# Patient Record
Sex: MALE | Race: WHITE | NOT HISPANIC OR LATINO | Employment: UNEMPLOYED | URBAN - METROPOLITAN AREA
[De-identification: names, ages, dates, MRNs, and addresses within clinical notes are randomized per-mention and may not be internally consistent; named-entity substitution may affect disease eponyms.]

---

## 2019-02-02 ENCOUNTER — HOSPITAL ENCOUNTER (OUTPATIENT)
Dept: URGENT CARE | Facility: CLINIC | Age: 24
Discharge: HOME | End: 2019-02-02
Attending: EMERGENCY MEDICINE

## 2019-03-19 ENCOUNTER — OFFICE VISIT CONVERTED (OUTPATIENT)
Dept: INTERNAL MEDICINE | Facility: CLINIC | Age: 24
End: 2019-03-19
Attending: NURSE PRACTITIONER

## 2019-03-19 ENCOUNTER — HOSPITAL ENCOUNTER (OUTPATIENT)
Dept: OTHER | Facility: HOSPITAL | Age: 24
Discharge: HOME | End: 2019-03-19
Attending: NURSE PRACTITIONER

## 2019-04-08 ENCOUNTER — HOSPITAL ENCOUNTER (OUTPATIENT)
Dept: URGENT CARE | Facility: CLINIC | Age: 24
Discharge: HOME | End: 2019-04-08
Attending: NURSE PRACTITIONER

## 2020-07-27 ENCOUNTER — HOSPITAL ENCOUNTER (OUTPATIENT)
Dept: URGENT CARE | Facility: CLINIC | Age: 25
Discharge: HOME | End: 2020-07-27
Attending: EMERGENCY MEDICINE

## 2020-08-08 ENCOUNTER — HOSPITAL ENCOUNTER (OUTPATIENT)
Dept: URGENT CARE | Facility: CLINIC | Age: 25
Discharge: HOME | End: 2020-08-08
Attending: PHYSICIAN ASSISTANT

## 2020-08-08 LAB — MONONUCLEOSIS TEST, QUAL: NEGATIVE

## 2020-08-10 ENCOUNTER — HOSPITAL ENCOUNTER (OUTPATIENT)
Dept: URGENT CARE | Facility: CLINIC | Age: 25
Discharge: HOME | End: 2020-08-10
Attending: EMERGENCY MEDICINE

## 2020-08-10 LAB — BACTERIA SPEC AEROBE CULT: ABNORMAL

## 2020-08-13 LAB — SARS-COV-2 RNA SPEC QL NAA+PROBE: NOT DETECTED

## 2020-10-03 ENCOUNTER — HOSPITAL ENCOUNTER (OUTPATIENT)
Dept: URGENT CARE | Facility: CLINIC | Age: 25
Discharge: HOME | End: 2020-10-03
Attending: FAMILY MEDICINE

## 2021-03-25 ENCOUNTER — OFFICE VISIT CONVERTED (OUTPATIENT)
Dept: INTERNAL MEDICINE | Facility: CLINIC | Age: 26
End: 2021-03-25
Attending: NURSE PRACTITIONER

## 2021-03-25 ENCOUNTER — HOSPITAL ENCOUNTER (OUTPATIENT)
Dept: OTHER | Facility: HOSPITAL | Age: 26
Discharge: HOME | End: 2021-03-25
Attending: NURSE PRACTITIONER

## 2021-03-25 LAB
ALBUMIN SERPL-MCNC: 4.8 G/DL (ref 3.5–5)
ALBUMIN/GLOB SERPL: 2 {RATIO} (ref 1.4–2.6)
ALP SERPL-CCNC: 70 U/L (ref 53–128)
ALT SERPL-CCNC: 29 U/L (ref 10–40)
ANION GAP SERPL CALC-SCNC: 17 MMOL/L (ref 8–19)
AST SERPL-CCNC: 27 U/L (ref 15–50)
BASOPHILS # BLD AUTO: 0.01 10*3/UL (ref 0–0.2)
BASOPHILS NFR BLD AUTO: 0.3 % (ref 0–3)
BILIRUB SERPL-MCNC: 0.75 MG/DL (ref 0.2–1.3)
BUN SERPL-MCNC: 20 MG/DL (ref 5–25)
BUN/CREAT SERPL: 25 {RATIO} (ref 6–20)
CALCIUM SERPL-MCNC: 9.3 MG/DL (ref 8.7–10.4)
CHLORIDE SERPL-SCNC: 102 MMOL/L (ref 99–111)
CHOLEST SERPL-MCNC: 144 MG/DL (ref 107–200)
CHOLEST/HDLC SERPL: 2.6 {RATIO} (ref 3–6)
CONV ABS IMM GRAN: 0.01 10*3/UL (ref 0–0.2)
CONV CO2: 25 MMOL/L (ref 22–32)
CONV IMMATURE GRAN: 0.3 % (ref 0–1.8)
CONV TOTAL PROTEIN: 7.2 G/DL (ref 6.3–8.2)
CREAT UR-MCNC: 0.81 MG/DL (ref 0.7–1.2)
DEPRECATED RDW RBC AUTO: 38.9 FL (ref 35.1–43.9)
EOSINOPHIL # BLD AUTO: 0.04 10*3/UL (ref 0–0.7)
EOSINOPHIL # BLD AUTO: 1 % (ref 0–7)
ERYTHROCYTE [DISTWIDTH] IN BLOOD BY AUTOMATED COUNT: 11.9 % (ref 11.6–14.4)
GFR SERPLBLD BASED ON 1.73 SQ M-ARVRAT: >60 ML/MIN/{1.73_M2}
GLOBULIN UR ELPH-MCNC: 2.4 G/DL (ref 2–3.5)
GLUCOSE SERPL-MCNC: 88 MG/DL (ref 70–99)
HCT VFR BLD AUTO: 47 % (ref 42–52)
HDLC SERPL-MCNC: 55 MG/DL (ref 40–60)
HGB BLD-MCNC: 16.5 G/DL (ref 14–18)
LDLC SERPL CALC-MCNC: 78 MG/DL (ref 70–100)
LYMPHOCYTES # BLD AUTO: 1.02 10*3/UL (ref 1–5)
LYMPHOCYTES NFR BLD AUTO: 26.8 % (ref 20–45)
MCH RBC QN AUTO: 31.5 PG (ref 27–31)
MCHC RBC AUTO-ENTMCNC: 35.1 G/DL (ref 33–37)
MCV RBC AUTO: 89.7 FL (ref 80–96)
MONOCYTES # BLD AUTO: 0.38 10*3/UL (ref 0.2–1.2)
MONOCYTES NFR BLD AUTO: 10 % (ref 3–10)
NEUTROPHILS # BLD AUTO: 2.35 10*3/UL (ref 2–8)
NEUTROPHILS NFR BLD AUTO: 61.6 % (ref 30–85)
NRBC CBCN: 0 % (ref 0–0.7)
OSMOLALITY SERPL CALC.SUM OF ELEC: 290 MOSM/KG (ref 273–304)
PLATELET # BLD AUTO: 165 10*3/UL (ref 130–400)
PMV BLD AUTO: 11.5 FL (ref 9.4–12.4)
POTASSIUM SERPL-SCNC: 4.5 MMOL/L (ref 3.5–5.3)
RBC # BLD AUTO: 5.24 10*6/UL (ref 4.7–6.1)
SODIUM SERPL-SCNC: 139 MMOL/L (ref 135–147)
TRIGL SERPL-MCNC: 53 MG/DL (ref 40–150)
TSH SERPL-ACNC: 1.52 M[IU]/L (ref 0.27–4.2)
VLDLC SERPL-MCNC: 11 MG/DL (ref 5–37)
WBC # BLD AUTO: 3.81 10*3/UL (ref 4.8–10.8)

## 2021-05-14 VITALS
SYSTOLIC BLOOD PRESSURE: 130 MMHG | OXYGEN SATURATION: 99 % | TEMPERATURE: 98 F | WEIGHT: 143 LBS | HEART RATE: 89 BPM | DIASTOLIC BLOOD PRESSURE: 82 MMHG | HEIGHT: 68 IN | BODY MASS INDEX: 21.67 KG/M2

## 2021-05-15 VITALS
DIASTOLIC BLOOD PRESSURE: 78 MMHG | TEMPERATURE: 98.3 F | RESPIRATION RATE: 15 BRPM | HEIGHT: 68 IN | WEIGHT: 152.12 LBS | SYSTOLIC BLOOD PRESSURE: 130 MMHG | BODY MASS INDEX: 23.05 KG/M2 | HEART RATE: 85 BPM | OXYGEN SATURATION: 99 %

## 2021-09-21 PROCEDURE — 87081 CULTURE SCREEN ONLY: CPT | Performed by: FAMILY MEDICINE

## 2021-09-21 PROCEDURE — 87635 SARS-COV-2 COVID-19 AMP PRB: CPT | Performed by: FAMILY MEDICINE

## 2021-09-23 ENCOUNTER — HOSPITAL ENCOUNTER (EMERGENCY)
Facility: HOSPITAL | Age: 26
Discharge: HOME | End: 2021-09-24
Attending: EMERGENCY MEDICINE | Admitting: EMERGENCY MEDICINE

## 2021-09-23 ENCOUNTER — APPOINTMENT (OUTPATIENT)
Dept: GENERAL RADIOLOGY | Facility: HOSPITAL | Age: 26
End: 2021-09-23

## 2021-09-23 DIAGNOSIS — B34.9 ACUTE VIRAL SYNDROME: ICD-10-CM

## 2021-09-23 DIAGNOSIS — Z20.822 SUSPECTED COVID-19 VIRUS INFECTION: Primary | ICD-10-CM

## 2021-09-23 PROCEDURE — 71045 X-RAY EXAM CHEST 1 VIEW: CPT

## 2021-09-23 PROCEDURE — 63710000001 ONDANSETRON ODT 4 MG TABLET DISPERSIBLE: Performed by: EMERGENCY MEDICINE

## 2021-09-23 PROCEDURE — 99283 EMERGENCY DEPT VISIT LOW MDM: CPT

## 2021-09-23 PROCEDURE — 87635 SARS-COV-2 COVID-19 AMP PRB: CPT | Performed by: NURSE PRACTITIONER

## 2021-09-23 RX ORDER — ONDANSETRON 4 MG/1
4 TABLET, ORALLY DISINTEGRATING ORAL ONCE
Status: COMPLETED | OUTPATIENT
Start: 2021-09-23 | End: 2021-09-23

## 2021-09-23 RX ADMIN — ONDANSETRON 4 MG: 4 TABLET, ORALLY DISINTEGRATING ORAL at 23:38

## 2021-09-24 VITALS
TEMPERATURE: 97.2 F | SYSTOLIC BLOOD PRESSURE: 128 MMHG | WEIGHT: 146.61 LBS | OXYGEN SATURATION: 99 % | RESPIRATION RATE: 16 BRPM | DIASTOLIC BLOOD PRESSURE: 63 MMHG | HEIGHT: 69 IN | BODY MASS INDEX: 21.71 KG/M2 | HEART RATE: 80 BPM

## 2021-09-24 LAB — SARS-COV-2 N GENE RESP QL NAA+PROBE: DETECTED

## 2021-09-24 NOTE — ED PROVIDER NOTES
Time: 10:44 PM EDT  Arrived by: private car  History provided by: pt  History is limited by: N/A     History of Present Illness:  Patient is a 26 y.o. year old male that presents to the emergency department with ILLNESS.    Pt states he has felt ill for about two weeks with worsening symptoms. Pt complains of nausea, fever, appetite change, HA, loss of taste and smell, and a sharp pain in his right ribs.   Pt has completed two COVID-19 tests on 9/21 (one rapid and one PCR) where both were negative.       History provided by:  Patient   used: No    Illness  Location:  Generalized  Quality:  N/A  Severity:  Moderate  Onset quality:  Gradual  Duration:  2 weeks  Timing:  Constant  Progression:  Worsening  Chronicity:  New  Context:  Possible COVID-19  Relieved by:  Ibuprofen  Worsened by:  Exertion  Ineffective treatments:  Nothing  Associated symptoms: fever, headaches and nausea    Associated symptoms: no abdominal pain, no chest pain, no congestion, no diarrhea, no ear pain, no rash, no rhinorrhea, no shortness of breath, no sore throat and no vomiting        Similar Symptoms Previously: none  Recently seen: not recently seen in this ED    Patient Care Team  Primary Care Provider: Kenya De Jesus APRN    Past Medical History:     No Known Allergies  History reviewed. No pertinent past medical history.  Past Surgical History:   Procedure Laterality Date   • DENTAL PROCEDURE       History reviewed. No pertinent family history.    Home Medications:  Prior to Admission medications    Not on File        Social History:   Social History     Tobacco Use   • Smoking status: Never Smoker   • Smokeless tobacco: Never Used   Vaping Use   • Vaping Use: Never used   Substance Use Topics   • Alcohol use: Not Currently   • Drug use: Never     Recent travel: not applicable     Review of Systems:  Review of Systems   Constitutional: Positive for appetite change and fever. Negative for chills.   HENT: Negative for  "congestion, ear pain, rhinorrhea, sore throat and tinnitus.         Loss of taste and smell   Eyes: Negative for photophobia and pain.   Respiratory: Negative for choking and shortness of breath.    Cardiovascular: Negative for chest pain, palpitations and leg swelling.   Gastrointestinal: Positive for nausea. Negative for abdominal distention, abdominal pain, diarrhea and vomiting.   Endocrine: Negative for polydipsia.   Genitourinary: Negative for difficulty urinating, dysuria and hematuria.   Musculoskeletal: Negative for back pain, gait problem and neck pain.        Sharp pain over right ribs   Skin: Negative for rash.   Neurological: Positive for headaches. Negative for dizziness, seizures, speech difficulty, weakness, light-headedness and numbness.   Hematological: Negative for adenopathy.   Psychiatric/Behavioral: Negative for agitation, confusion, self-injury and suicidal ideas.        Physical Exam:  /79 (BP Location: Right arm, Patient Position: Sitting)   Pulse 81   Temp 98.3 °F (36.8 °C) (Oral)   Resp 20   Ht 175.3 cm (69\")   Wt 66.5 kg (146 lb 9.7 oz)   SpO2 99%   BMI 21.65 kg/m²     Physical Exam  Vitals and nursing note reviewed.   Constitutional:       Appearance: Normal appearance. He is well-developed.   HENT:      Head: Normocephalic and atraumatic.      Right Ear: External ear normal.      Left Ear: External ear normal.      Nose: Nose normal.      Mouth/Throat:      Mouth: Mucous membranes are moist.      Pharynx: Oropharynx is clear.   Eyes:      General: Lids are normal.      Extraocular Movements: Extraocular movements intact.      Conjunctiva/sclera: Conjunctivae normal.      Pupils: Pupils are equal, round, and reactive to light.   Cardiovascular:      Rate and Rhythm: Normal rate and regular rhythm.      Pulses: Normal pulses.      Heart sounds: Normal heart sounds. No murmur heard.     Pulmonary:      Effort: Pulmonary effort is normal.      Breath sounds: Normal breath " sounds. No stridor. No wheezing.   Abdominal:      Palpations: Abdomen is soft.      Tenderness: There is no abdominal tenderness.   Musculoskeletal:         General: Normal range of motion.      Cervical back: Full passive range of motion without pain, normal range of motion and neck supple.      Right lower leg: No edema.      Left lower leg: No edema.   Skin:     General: Skin is warm and dry.      Capillary Refill: Capillary refill takes less than 2 seconds.   Neurological:      General: No focal deficit present.      Mental Status: He is alert and oriented to person, place, and time. Mental status is at baseline.      Cranial Nerves: Cranial nerves are intact.      Sensory: Sensation is intact.      Motor: Motor function is intact.      Coordination: Coordination is intact.   Psychiatric:         Attention and Perception: Attention and perception normal.         Mood and Affect: Mood and affect normal.         Speech: Speech normal.         Behavior: Behavior normal. Behavior is cooperative.         Thought Content: Thought content normal.         Cognition and Memory: Cognition and memory normal.                Medications in the Emergency Department:  Medications   ondansetron ODT (ZOFRAN-ODT) disintegrating tablet 4 mg (4 mg Oral Given 9/23/21 6028)        Labs  Lab Results (last 24 hours)     Procedure Component Value Units Date/Time    COVID-19,CEPHEID/PREMA/BDMAX,COR/SADIQ/PAD/EUFEMIA IN-HOUSE(OR EMERGENT/ADD-ON),NP SWAB IN TRANSPORT MEDIA 3-4 HR TAT, RT-PCR - Swab, Nasopharynx [[id number]] Collected: 09/23/21 2224    Specimen: Swab from Nasopharynx Updated: 09/23/21 2254           Imaging:  XR Chest 1 View    Result Date: 9/23/2021  PROCEDURE: XR CHEST 1 VW  COMPARISON: [facility], CR, CHEST PA/AP & LAT 2V, 3/25/2021, 10:35.  INDICATIONS: cough/shortness of breath  FINDINGS:A single AP upright portable chest radiograph was performed.  No cardiac enlargement is seen.  No acute infiltrate  is appreciated.  No pleural effusion or pneumothorax is identified.  No significant interval change is seen since the prior study.  CONCLUSION:No acute infiltrate is appreciated.      JIA GIL JR, MD       Electronically Signed and Approved By: JIA GIL JR, MD on 9/23/2021 at 23:50               Procedures:  Procedures    Progress                            Medical Decision Making:  MDM     COVID 19 test performed in ED, results pending.     I have provided education on COVID-19 and viral illnesses to this patient, and educated them on when they should return to their primary care provider or the emergency department itself should their symptoms worsen. On re-examination, the patient does not appear toxic and has no meningeal signs (including a negative Kernig and Brudzinski sign), and there´s no intractable vomiting, respiratory distress and no apparent pain. Based on the history, exam, diagnostic testing and reassessment, the patient has no signs of meningitis, significant pneumonia, pyelonephritis, sepsis or other acute serious bacterial infections, or other significant pathology to warrant further testing, continued ED treatment, admission or specialist evaluation. On re-examination, the patient does not appear toxic and has no meningeal signs (including a negative Kernig and Brudzinski sign), and there´s no intractable vomiting, respiratory distress and no apparent pain. Based on the history, exam, diagnostic testing and reassessment, the patient has no signs of meningitis, significant pneumonia, pyelonephritis, sepsis or other acute serious bacterial infections, or other significant pathology to warrant further testing, continued ED treatment, admission or specialist evaluation. They verbalized understanding of this diagnosis, my treatment plant, and recommendations for follow up. The patient was counseled to return to the ED for reevaluation for worsening cough, shortness of breath, uncontrollable  headache, uncontrollable fever, altered mental status, or any symptoms which caused them concern.     Discussed importance of self-quarantine until results are obtained.      Final diagnoses:   Suspected COVID-19 virus infection   Acute viral syndrome        Disposition:  ED Disposition     ED Disposition Condition Comment    Discharge Stable           Documentation assistance provided by Jolene Vann acting as scribe for Noel Zayas MD. Information recorded by the scribe was done at my direction and has been verified and validated by me.          Jolene Vann  09/23/21 1896       Noel Zayas MD  09/24/21 3889

## 2021-09-24 NOTE — PROGRESS NOTES
UNABLE TO REACH PATIENT MAILING OUT POSITIVE CERTIFIED MAIL TO ADDRESS ON FILE  [phone]

## 2021-10-01 ENCOUNTER — OFFICE VISIT (OUTPATIENT)
Dept: INTERNAL MEDICINE | Facility: CLINIC | Age: 26
End: 2021-10-01

## 2021-10-01 VITALS
BODY MASS INDEX: 21.48 KG/M2 | SYSTOLIC BLOOD PRESSURE: 132 MMHG | WEIGHT: 145 LBS | HEART RATE: 84 BPM | DIASTOLIC BLOOD PRESSURE: 70 MMHG | OXYGEN SATURATION: 99 % | TEMPERATURE: 97.8 F | RESPIRATION RATE: 15 BRPM | HEIGHT: 69 IN

## 2021-10-01 DIAGNOSIS — J40 BRONCHITIS: ICD-10-CM

## 2021-10-01 DIAGNOSIS — U07.1 COVID-19 VIRUS DETECTED: Primary | ICD-10-CM

## 2021-10-01 PROCEDURE — 99213 OFFICE O/P EST LOW 20 MIN: CPT | Performed by: PHYSICIAN ASSISTANT

## 2021-10-01 RX ORDER — AZITHROMYCIN 250 MG/1
TABLET, FILM COATED ORAL
Qty: 6 TABLET | Refills: 0 | OUTPATIENT
Start: 2021-10-01 | End: 2022-10-23

## 2021-10-01 NOTE — PROGRESS NOTES
"Chief Complaint  Cough    Subjective          James Leo presents to [facility]  Patient went to ER 9/23 for suspected Covid.  Negative test 9/21.  Symptoms started around 9/10.  Nausea, fever, decreased appetite, headache, loss of taste and smell, pain in ribs.  Chest x-ray was normal.  Covid test.    Pt states he is feeling better.   He is still coughing up green mucus.   Denies wheezing, resp distress.  Runny nose at times and pressure in head   Denies fever, sore throat  HA is improved  Still not able to smell, slight improvement in taste      History reviewed. No pertinent past medical history.     Past Surgical History:   Procedure Laterality Date   • DENTAL PROCEDURE          No current outpatient medications on file prior to visit.     No current facility-administered medications on file prior to visit.        No Known Allergies    Social History     Tobacco Use   Smoking Status Never Smoker   Smokeless Tobacco Never Used          Objective   Vital Signs:   /70   Pulse 84   Temp 97.8 °F (36.6 °C)   Resp 15   Ht 175.3 cm (69\")   Wt 65.8 kg (145 lb)   SpO2 99%   BMI 21.41 kg/m²     Physical Exam  Vitals reviewed.   Constitutional:       Appearance: Normal appearance.   HENT:      Head: Normocephalic and atraumatic.      Nose: Nose normal.      Mouth/Throat:      Mouth: Mucous membranes are moist.   Eyes:      Extraocular Movements: Extraocular movements intact.      Conjunctiva/sclera: Conjunctivae normal.      Pupils: Pupils are equal, round, and reactive to light.   Cardiovascular:      Rate and Rhythm: Normal rate and regular rhythm.   Pulmonary:      Effort: Pulmonary effort is normal.      Breath sounds: Normal breath sounds.   Abdominal:      General: Abdomen is flat. Bowel sounds are normal.      Palpations: Abdomen is soft.   Musculoskeletal:         General: Normal range of motion.   Neurological:      General: No focal deficit present. "      Mental Status: He is alert and oriented to person, place, and time.   Psychiatric:         Mood and Affect: Mood normal.        Result Review :                 Assessment and Plan    Diagnoses and all orders for this visit:    1. COVID-19 virus detected (Primary)  Comments:  reviewed ER note. O2 and other vitals WNL. No need for emergent imaging.    2. Bronchitis  Comments:  Will cover for secondary bacterial infection with abx today. Pt will monitor for worsening sx and go to ER if sx worsen, difficulty breathing, resp distress    Other orders  -     azithromycin (Zithromax) 250 MG tablet; Take 2 tabs on day 1 then 1 tab daily  Dispense: 6 tablet; Refill: 0        Follow Up   No follow-ups on file.  Patient was given instructions and counseling regarding his condition or for health maintenance advice. Please see specific information pulled into the AVS if appropriate.

## 2021-10-11 ENCOUNTER — TELEPHONE (OUTPATIENT)
Dept: INTERNAL MEDICINE | Facility: CLINIC | Age: 26
End: 2021-10-11

## 2021-10-11 NOTE — TELEPHONE ENCOUNTER
Caller: James Leo    Relationship: Self    Best call back number: [phone]     Who are you requesting to speak with (clinical staff, provider,  specific staff member): MSLibrado ISMAEL SIMS      What was the call regarding: THE PATIENT'S PLACE OF WORK IS REQUESTING THAT THE PATIENT HAVE A STATEMENT FROM HIS PROVIDER, CLEARING HIM TO GO BACK TO WORK.  THE PATIENT WOULD LIKE TO  IN OFFICE WHEN AVAILABLE.     Do you require a callback: YES.

## 2022-02-08 PROCEDURE — U0004 COV-19 TEST NON-CDC HGH THRU: HCPCS | Performed by: EMERGENCY MEDICINE

## 2023-03-16 ENCOUNTER — HOSPITAL ENCOUNTER (EMERGENCY)
Facility: HOSPITAL | Age: 28
Discharge: HOME | End: 2023-03-16
Attending: EMERGENCY MEDICINE | Admitting: EMERGENCY MEDICINE
Payer: COMMERCIAL

## 2023-03-16 VITALS
WEIGHT: 152.56 LBS | TEMPERATURE: 98.1 F | HEIGHT: 69 IN | OXYGEN SATURATION: 100 % | BODY MASS INDEX: 22.6 KG/M2 | DIASTOLIC BLOOD PRESSURE: 79 MMHG | SYSTOLIC BLOOD PRESSURE: 137 MMHG | RESPIRATION RATE: 20 BRPM | HEART RATE: 89 BPM

## 2023-03-16 DIAGNOSIS — B34.9 ACUTE VIRAL SYNDROME: Primary | ICD-10-CM

## 2023-03-16 LAB
FLUAV AG NPH QL: NEGATIVE
FLUBV AG NPH QL IA: NEGATIVE
S PYO AG THROAT QL: NEGATIVE
SARS-COV-2 RNA RESP QL NAA+PROBE: NOT DETECTED

## 2023-03-16 PROCEDURE — 87880 STREP A ASSAY W/OPTIC: CPT

## 2023-03-16 PROCEDURE — 87804 INFLUENZA ASSAY W/OPTIC: CPT

## 2023-03-16 PROCEDURE — C9803 HOPD COVID-19 SPEC COLLECT: HCPCS | Performed by: EMERGENCY MEDICINE

## 2023-03-16 PROCEDURE — U0004 COV-19 TEST NON-CDC HGH THRU: HCPCS

## 2023-03-16 PROCEDURE — 87081 CULTURE SCREEN ONLY: CPT

## 2023-03-16 PROCEDURE — 99283 EMERGENCY DEPT VISIT LOW MDM: CPT

## 2023-03-16 NOTE — DISCHARGE INSTRUCTIONS
Your flu and strep test completed in the emergency department today are negative.  The COVID test is still pending.  You may view the results of this on [employer] or if this is positive you should receive a call from the hospital tomorrow.  You may take over-the-counter flu and cold medications as needed for symptom control.  Return to the emergency department for chest pain, shortness of breath, or other symptoms concerning to you.  Follow-up with your primary care provider if symptoms do not improve within 1 week.

## 2023-03-16 NOTE — ED PROVIDER NOTES
"Time: 11:38 AM EDT  Date of encounter:  3/16/2023  Independent Historian/Clinical History and Information was obtained by:   Patient  Chief Complaint: generalized body aches, sinus congestion, chills, and sore throat    History is limited by: N/A    History of Present Illness:  Patient is a 27 y.o. year old male who presents to the emergency department for evaluation of worsening myalgias.    Patient reports he began feeling mildly ill 3 days ago with generalized muscle cramping. He states this has gotten worse over the week, and he has since developed severe abdominal pain (last night), severe headache, sinus pressure and pain, post-nasal drainage, worsened myalgias, generalized arthralgias, sore throat, and chills. He denies any recent cough. He has not taken his temperature at home, but has felt subjective fever at home. He had a coworker who recently was sick with \"a 24 hour bug,\" but states his symptoms have lasted longer than the coworker's had.           Patient Care Team  Primary Care Provider: Kenya De Jesus APRN    Past Medical History:     No Known Allergies  Past Medical History:   Diagnosis Date   • Asthma    • Mononucleosis      Past Surgical History:   Procedure Laterality Date   • DENTAL PROCEDURE       History reviewed. No pertinent family history.    Home Medications:  Prior to Admission medications    Medication Sig Start Date End Date Taking? Authorizing Provider   dicyclomine (BENTYL) 20 MG tablet Take 1 tablet by mouth Every 6 (Six) Hours. 1/1/23   Sandra Okeefe APRN   ibuprofen (ADVIL,MOTRIN) 400 MG tablet Take 1 tablet by mouth Every 6 (Six) Hours As Needed for Mild Pain.    Provider, Cecilia, MD        Social History:   Social History     Tobacco Use   • Smoking status: Never   • Smokeless tobacco: Never   Vaping Use   • Vaping Use: Never used   Substance Use Topics   • Alcohol use: Yes     Comment: OCC   • Drug use: Never         Review of Systems:  Review of Systems " "  Constitutional: Positive for chills and fever (subjective, \"my head has been burning up\").   HENT: Positive for postnasal drip, rhinorrhea, sinus pressure, sinus pain and sore throat. Negative for congestion and ear pain.    Eyes: Negative for pain.   Respiratory: Negative for cough and shortness of breath.    Cardiovascular: Negative for chest pain.   Gastrointestinal: Positive for abdominal pain. Negative for diarrhea, nausea and vomiting.   Genitourinary: Negative for dysuria and hematuria.   Musculoskeletal: Positive for arthralgias and myalgias.   Skin: Negative for rash.   Neurological: Positive for headaches. Negative for dizziness and tremors.        Physical Exam:  /79   Pulse 89   Temp 98.1 °F (36.7 °C)   Resp 20   Ht 175.3 cm (69\")   Wt 69.2 kg (152 lb 8.9 oz)   SpO2 100%   BMI 22.53 kg/m²     Physical Exam  Vitals and nursing note reviewed.   Constitutional:       General: He is not in acute distress.     Appearance: Normal appearance. He is normal weight. He is not ill-appearing, toxic-appearing or diaphoretic.   HENT:      Head: Normocephalic and atraumatic.      Right Ear: Tympanic membrane, ear canal and external ear normal. There is no impacted cerumen.      Left Ear: Tympanic membrane, ear canal and external ear normal. There is no impacted cerumen.      Nose: Nose normal.      Mouth/Throat:      Mouth: Mucous membranes are moist.      Pharynx: Oropharynx is clear. Posterior oropharyngeal erythema present. No oropharyngeal exudate.   Eyes:      Extraocular Movements: Extraocular movements intact.      Conjunctiva/sclera: Conjunctivae normal.      Pupils: Pupils are equal, round, and reactive to light.   Cardiovascular:      Rate and Rhythm: Normal rate and regular rhythm.      Heart sounds: Normal heart sounds.   Pulmonary:      Effort: Pulmonary effort is normal.      Breath sounds: Normal breath sounds.   Abdominal:      General: Abdomen is flat. Bowel sounds are normal. There is " no distension.      Palpations: Abdomen is soft. There is no mass.      Tenderness: There is generalized abdominal tenderness. There is no guarding.      Hernia: No hernia is present.   Musculoskeletal:         General: Normal range of motion.      Cervical back: Normal range of motion and neck supple.   Skin:     General: Skin is warm and dry.   Neurological:      General: No focal deficit present.      Mental Status: He is alert and oriented to person, place, and time.   Psychiatric:         Mood and Affect: Mood normal.         Behavior: Behavior normal.         Thought Content: Thought content normal.         Judgment: Judgment normal.                  Procedures:  Procedures      Medical Decision Making:    Comorbidities that affect care:    Asthma    External Notes reviewed:    Previous Clinic Note: Urgent care note from 1-1-2023      The following orders were placed and all results were independently analyzed by me:  Orders Placed This Encounter   Procedures   • Influenza Antigen, Rapid - Swab, Nasopharynx   • COVID-19,APTIMA PANTHER(ALFREDO),[facility] URIEL/BH EUFEMIA, NP/OP SWAB IN UTM/VTM/SALINE TRANSPORT MEDIA,24 HR TAT - Swab, Nasal Cavity   • Rapid Strep A Screen - Swab, Throat   • Beta Strep Culture, Throat - Swab, Throat       Medications Given in the Emergency Department:  Medications - No data to display     ED Course:         Labs:    Lab Results (last 24 hours)     Procedure Component Value Units Date/Time    Influenza Antigen, Rapid - Swab, Nasopharynx [[id number]]  (Normal) Collected: 03/16/23 1123    Specimen: Swab from Nasopharynx Updated: 03/16/23 1203     Influenza A Ag, EIA Negative     Influenza B Ag, EIA Negative    COVID-19,APTIMA PANTHER(ALFREDO),[facility] URIEL/BH EUFEMIA, NP/OP SWAB IN UTM/VTM/SALINE TRANSPORT MEDIA,24 HR TAT - Swab, Nasal Cavity [[id number]] Collected: 03/16/23 1123    Specimen: Swab from Nasal Cavity Updated: 03/16/23 1126    Rapid Strep A Screen - Swab, Throat [[id number]]  (Normal) Collected:  03/16/23 1136    Specimen: Swab from Throat Updated: 03/16/23 1210     Strep A Ag Negative    Beta Strep Culture, Throat - Swab, Throat [[id number]] Collected: 03/16/23 1136    Specimen: Swab from Throat Updated: 03/16/23 1210           Imaging:    No Radiology Exams Resulted Within Past 24 Hours      Differential Diagnosis and Discussion:    Abdominal Pain: Based on the patient's signs and symptoms, I considered abdominal aortic aneurysm, small bowel obstruction, pancreatitis, acute cholecystitis, acute appendecitis, peptic ulcer disease, gastritis, colitis, endocrine disorders, irritable bowel syndrome and other differential diagnosis an etiology of the patient's abdominal pain.  Headache: Differential diagnosis includes but is not limited to migraine, cluster headache, hypertension, tumor, subarachnoid bleeding, pseudotumor cerebri, temporal arteritis, infections, tension headache, and TMJ syndrome.  Sore Throat: Differential diagnosis includes but is not limited to bacterial infection, viral infection, inhaled irritants, sinus drainage, thyroiditis, epiglottitis, and retropharyngeal abscess.    All labs were reviewed and interpreted by me.    MDM  Number of Diagnoses or Management Options  Acute viral syndrome  Diagnosis management comments: Patient presented to the emergency department for body aches that began on Monday.  Patient has multiple other associated symptoms.  Rapid influenza and strep test are negative.  COVID test is pending.  Based on physical exam and patient's history this is most likely a viral illness.       Amount and/or Complexity of Data Reviewed  Clinical lab tests: reviewed and ordered    Risk of Complications, Morbidity, and/or Mortality  Presenting problems: moderate  Diagnostic procedures: low  Management options: low    Patient Progress  Patient progress: stable       Patient Care Considerations:    CHEST X-RAY: I considered ordering a chest x-ray however Lung sounds are clear and  patient has no shortness of breath or cough      Consultants/Shared Management Plan:    None    Social Determinants of Health:    Patient is independent, reliable, and has access to care.       Disposition and Care Coordination:    Discharged: The patient is suitable and stable for discharge with no need for consideration of observation or admission.    I have explained the patient´s condition, diagnoses and treatment plan based on the information available to me at this time. I have answered questions and addressed any concerns. The patient has a good  understanding of the patient´s diagnosis, condition, and treatment plan as can be expected at this point. The vital signs have been stable. The patient´s condition is stable and appropriate for discharge from the emergency department.      The patient will pursue further outpatient evaluation with the primary care physician or other designated or consulting physician as outlined in the discharge instructions. They are agreeable to this plan of care and follow-up instructions have been explained in detail. The patient has received these instructions in written format and have expressed an understanding of the discharge instructions. The patient is aware that any significant change in condition or worsening of symptoms should prompt an immediate return to this or the closest emergency department or call to 911.    Final diagnoses:   Acute viral syndrome        ED Disposition     ED Disposition   Discharge    Condition   Stable    Comment   --             This medical record created using voice recognition software.    Ila SOLARES, am scribing for and in the presence of Marino Jha PA-C. 3/16/2023 12:24 EDT    Marino SOLARES PA-C, personally performed the services described in this documentation, as scribed by Ila Pacheco in my presence, and it is both accurate and complete.        Ila Pacheco  03/16/23 1159       Marino Jha PA-C  03/16/23 1229

## 2023-03-18 LAB — BACTERIA SPEC AEROBE CULT: NORMAL

## 2023-04-07 ENCOUNTER — APPOINTMENT (OUTPATIENT)
Dept: GENERAL RADIOLOGY | Facility: HOSPITAL | Age: 28
End: 2023-04-07
Payer: COMMERCIAL

## 2023-04-07 ENCOUNTER — HOSPITAL ENCOUNTER (EMERGENCY)
Facility: HOSPITAL | Age: 28
Discharge: HOME | End: 2023-04-07
Attending: EMERGENCY MEDICINE | Admitting: EMERGENCY MEDICINE
Payer: COMMERCIAL

## 2023-04-07 VITALS
RESPIRATION RATE: 17 BRPM | SYSTOLIC BLOOD PRESSURE: 135 MMHG | OXYGEN SATURATION: 99 % | DIASTOLIC BLOOD PRESSURE: 68 MMHG | TEMPERATURE: 98.2 F | WEIGHT: 156.53 LBS | HEART RATE: 83 BPM | HEIGHT: 69 IN | BODY MASS INDEX: 23.18 KG/M2

## 2023-04-07 DIAGNOSIS — J06.9 URI WITH COUGH AND CONGESTION: Primary | ICD-10-CM

## 2023-04-07 DIAGNOSIS — S90.31XA CONTUSION OF RIGHT FOOT, INITIAL ENCOUNTER: ICD-10-CM

## 2023-04-07 LAB
ALBUMIN SERPL-MCNC: 4.5 G/DL (ref 3.5–5.2)
ALBUMIN/GLOB SERPL: 1.8 G/DL
ALP SERPL-CCNC: 73 U/L (ref 39–117)
ALT SERPL W P-5'-P-CCNC: 25 U/L (ref 1–41)
ANION GAP SERPL CALCULATED.3IONS-SCNC: 9.7 MMOL/L (ref 5–15)
AST SERPL-CCNC: 28 U/L (ref 1–40)
BASOPHILS # BLD AUTO: 0.02 10*3/MM3 (ref 0–0.2)
BASOPHILS NFR BLD AUTO: 0.3 % (ref 0–1.5)
BILIRUB SERPL-MCNC: 0.5 MG/DL (ref 0–1.2)
BUN SERPL-MCNC: 20 MG/DL (ref 6–20)
BUN/CREAT SERPL: 23.8 (ref 7–25)
CALCIUM SPEC-SCNC: 9.1 MG/DL (ref 8.6–10.5)
CHLORIDE SERPL-SCNC: 103 MMOL/L (ref 98–107)
CO2 SERPL-SCNC: 27.3 MMOL/L (ref 22–29)
CREAT SERPL-MCNC: 0.84 MG/DL (ref 0.76–1.27)
DEPRECATED RDW RBC AUTO: 39.8 FL (ref 37–54)
EGFRCR SERPLBLD CKD-EPI 2021: 121.8 ML/MIN/1.73
EOSINOPHIL # BLD AUTO: 0.08 10*3/MM3 (ref 0–0.4)
EOSINOPHIL NFR BLD AUTO: 1.2 % (ref 0.3–6.2)
ERYTHROCYTE [DISTWIDTH] IN BLOOD BY AUTOMATED COUNT: 12.5 % (ref 12.3–15.4)
FLUAV AG NPH QL: NEGATIVE
FLUBV AG NPH QL IA: NEGATIVE
GLOBULIN UR ELPH-MCNC: 2.5 GM/DL
GLUCOSE SERPL-MCNC: 82 MG/DL (ref 65–99)
HCT VFR BLD AUTO: 42 % (ref 37.5–51)
HGB BLD-MCNC: 15.3 G/DL (ref 13–17.7)
IMM GRANULOCYTES # BLD AUTO: 0.01 10*3/MM3 (ref 0–0.05)
IMM GRANULOCYTES NFR BLD AUTO: 0.1 % (ref 0–0.5)
LYMPHOCYTES # BLD AUTO: 1.05 10*3/MM3 (ref 0.7–3.1)
LYMPHOCYTES NFR BLD AUTO: 15.6 % (ref 19.6–45.3)
MCH RBC QN AUTO: 32.1 PG (ref 26.6–33)
MCHC RBC AUTO-ENTMCNC: 36.4 G/DL (ref 31.5–35.7)
MCV RBC AUTO: 88.1 FL (ref 79–97)
MONOCYTES # BLD AUTO: 0.7 10*3/MM3 (ref 0.1–0.9)
MONOCYTES NFR BLD AUTO: 10.4 % (ref 5–12)
NEUTROPHILS NFR BLD AUTO: 4.85 10*3/MM3 (ref 1.7–7)
NEUTROPHILS NFR BLD AUTO: 72.4 % (ref 42.7–76)
NRBC BLD AUTO-RTO: 0 /100 WBC (ref 0–0.2)
PLATELET # BLD AUTO: 142 10*3/MM3 (ref 140–450)
PMV BLD AUTO: 10.9 FL (ref 6–12)
POTASSIUM SERPL-SCNC: 4.3 MMOL/L (ref 3.5–5.2)
PROT SERPL-MCNC: 7 G/DL (ref 6–8.5)
RBC # BLD AUTO: 4.77 10*6/MM3 (ref 4.14–5.8)
S PYO AG THROAT QL: NEGATIVE
SODIUM SERPL-SCNC: 140 MMOL/L (ref 136–145)
WBC NRBC COR # BLD: 6.71 10*3/MM3 (ref 3.4–10.8)

## 2023-04-07 PROCEDURE — 36415 COLL VENOUS BLD VENIPUNCTURE: CPT

## 2023-04-07 PROCEDURE — 80053 COMPREHEN METABOLIC PANEL: CPT | Performed by: EMERGENCY MEDICINE

## 2023-04-07 PROCEDURE — 85025 COMPLETE CBC W/AUTO DIFF WBC: CPT

## 2023-04-07 PROCEDURE — 87081 CULTURE SCREEN ONLY: CPT | Performed by: EMERGENCY MEDICINE

## 2023-04-07 PROCEDURE — 99283 EMERGENCY DEPT VISIT LOW MDM: CPT

## 2023-04-07 PROCEDURE — U0004 COV-19 TEST NON-CDC HGH THRU: HCPCS

## 2023-04-07 PROCEDURE — 87804 INFLUENZA ASSAY W/OPTIC: CPT

## 2023-04-07 PROCEDURE — 87880 STREP A ASSAY W/OPTIC: CPT

## 2023-04-07 PROCEDURE — 73630 X-RAY EXAM OF FOOT: CPT

## 2023-04-07 PROCEDURE — C9803 HOPD COVID-19 SPEC COLLECT: HCPCS | Performed by: EMERGENCY MEDICINE

## 2023-04-07 RX ORDER — GUAIFENESIN 600 MG/1
1200 TABLET, EXTENDED RELEASE ORAL 2 TIMES DAILY
Qty: 30 TABLET | Refills: 0 | Status: SHIPPED | OUTPATIENT
Start: 2023-04-07

## 2023-04-07 RX ORDER — BROMPHENIRAMINE MALEATE, PSEUDOEPHEDRINE HYDROCHLORIDE, AND DEXTROMETHORPHAN HYDROBROMIDE 2; 30; 10 MG/5ML; MG/5ML; MG/5ML
5 SYRUP ORAL 4 TIMES DAILY PRN
Qty: 473 ML | Refills: 0 | Status: SHIPPED | OUTPATIENT
Start: 2023-04-07

## 2023-04-07 NOTE — Clinical Note
Psychiatric EMERGENCY ROOM  [address]  [place] [address]  Phone: [phone]    James Leo was seen and treated in our emergency department on 4/7/2023.  He may return to work on 04/10/2023.         Thank you for choosing [facility].    Kaelyn Mcdermott APRN

## 2023-04-08 LAB — SARS-COV-2 RNA RESP QL NAA+PROBE: NOT DETECTED

## 2023-04-08 NOTE — ED TRIAGE NOTES
Pt also states that he wants to be seen for a right foot injury. Pt states he dropped a piece of steel on it this morning. No deformity noticed. Pt is walking on it at this time.

## 2023-04-08 NOTE — ED PROVIDER NOTES
Time: 8:25 PM EDT  Date of encounter:  4/7/2023  Independent Historian/Clinical History and Information was obtained by:   Patient  Chief Complaint   Patient presents with   • Flu Symptoms   • Cough   • Nasal Congestion   • Sore Throat   • Foot Injury       History is limited by: N/A    History of Present Illness:  Patient is a 27 y.o. year old male who presents to the emergency department for evaluation of illness that has been ongoing for over 2 weeks.  He reports nausea, dry eyes, sinus congestion, cough, headache, sore throat.  He was seen here before and tested for COVID, strep, and flu which were all negative.  He did not have any lab work.  He has not been on any antibiotics.  He also reports that he dropped a 300 pound piece of steel on his right foot today and would like to get that evaluated.  He was wearing steel toed boots at the time.  There is no swelling, bruising, abrasions, redness, or any other signs of injury.    HPI    Patient Care Team  Primary Care Provider: Kenya De Jesus APRN    Past Medical History:     No Known Allergies  Past Medical History:   Diagnosis Date   • Asthma    • Mononucleosis      Past Surgical History:   Procedure Laterality Date   • DENTAL PROCEDURE       History reviewed. No pertinent family history.    Home Medications:  Prior to Admission medications    Medication Sig Start Date End Date Taking? Authorizing Provider   dicyclomine (BENTYL) 20 MG tablet Take 1 tablet by mouth Every 6 (Six) Hours. 1/1/23   Sandra Okeefe APRN   ibuprofen (ADVIL,MOTRIN) 400 MG tablet Take 1 tablet by mouth Every 6 (Six) Hours As Needed for Mild Pain.    Provider, Historical, MD        Social History:   Social History     Tobacco Use   • Smoking status: Never   • Smokeless tobacco: Never   Vaping Use   • Vaping Use: Never used   Substance Use Topics   • Alcohol use: Yes     Comment: OCC   • Drug use: Never         Review of Systems:  Review of Systems   Constitutional: Positive for  "chills. Negative for fever.   HENT: Positive for congestion and sore throat. Negative for ear pain.    Eyes: Negative for pain.   Respiratory: Positive for cough. Negative for chest tightness and shortness of breath.    Cardiovascular: Negative for chest pain.   Gastrointestinal: Negative for abdominal pain, diarrhea, nausea and vomiting.   Genitourinary: Negative for flank pain and hematuria.   Musculoskeletal: Positive for arthralgias (right foot). Negative for joint swelling.   Skin: Negative for pallor.   Neurological: Negative for seizures and headaches.   All other systems reviewed and are negative.       Physical Exam:  /68 (BP Location: Right arm, Patient Position: Lying)   Pulse 83   Temp 98.2 °F (36.8 °C) (Oral)   Resp 17   Ht 175.3 cm (69\")   Wt 71 kg (156 lb 8.4 oz)   SpO2 99%   BMI 23.11 kg/m²     Physical Exam      General: Awake alert and in no acute distress     HEENT: Head normocephalic atraumatic, eyes PERRLA EOMI, nasal congestion, pharyngeal erythema, no exudate.     Neck: Supple full range of motion, no meningismus, no lymphadenopathy     Heart: Regular rate and rhythm, no murmurs or rubs, 2+ radial pulses bilaterally     Lungs: Clear to auscultation bilaterally without wheezes or crackles, no respiratory distress     Abdomen: Soft, nontender, nondistended, no rebound or guarding     Back exam:  No L-spine tenderness.  No rash.     Skin: Warm, dry, no rash     Musculoskeletal: Normal range of motion, no lower extremity edema, tenderness with palpation over dorsum and plantar surface of right midfoot, no ecchymosis, no swelling, neurovascular status intact     Neurologic: Oriented x3, no motor deficits no sensory deficits     Psychiatric: Mood appears stable, no psychosis            Procedures:  Procedures      Medical Decision Making:      Comorbidities that affect care:    Asthma    External Notes reviewed:          The following orders were placed and all results were " independently analyzed by me:  Orders Placed This Encounter   Procedures   • Influenza Antigen, Rapid - Swab, Nasopharynx   • Rapid Strep A Screen - Swab, Throat   • COVID-19,APTIMA PANTHER(ALFREDO),[facility]EL/[facility] EUFEMIA, NP/OP SWAB IN UTM/VTM/SALINE TRANSPORT MEDIA,24 HR TAT - Swab, Nasopharynx   • Beta Strep Culture, Throat - Swab, Throat   • XR Foot 3+ View Right   • Comprehensive Metabolic Panel   • CBC Auto Differential   • CBC & Differential       Medications Given in the Emergency Department:  Medications - No data to display     ED Course:    The patient was initially evaluated in the triage area where orders were placed. The patient was later dispositioned by LORI Todd.      The patient was advised to stay for completion of workup which includes but is not limited to communication of labs and radiological results, reassessment and plan. The patient was advised that leaving prior to disposition by a provider could result in critical findings that are not communicated to the patient.          Labs:    Lab Results (last 24 hours)     Procedure Component Value Units Date/Time    Influenza Antigen, Rapid - Swab, Nasopharynx [[id number]]  (Normal) Collected: 04/07/23 2024    Specimen: Swab from Nasopharynx Updated: 04/07/23 2057     Influenza A Ag, EIA Negative     Influenza B Ag, EIA Negative    Rapid Strep A Screen - Swab, Throat [[id number]]  (Normal) Collected: 04/07/23 2024    Specimen: Swab from Throat Updated: 04/07/23 2046     Strep A Ag Negative    COVID-19,APTIMA PANTHER(ALFREDO),[facility] URIEL/[facility] EUFEMIA, NP/OP SWAB IN UTM/VTM/SALINE TRANSPORT MEDIA,24 HR TAT - Swab, Nasopharynx [[id number]]  (Normal) Collected: 04/07/23 2024    Specimen: Swab from Nasopharynx Updated: 04/08/23 0132     COVID19 Not Detected    Narrative:      Fact sheet for providers: https://www.fda.gov/media/113112/download     Fact sheet for patients: https://www.fda.gov/media/179229/download    Test performed by RT PCR.    Beta Strep Culture, Throat -  Swab, Throat [[id number]] Collected: 04/07/23 2024    Specimen: Swab from Throat Updated: 04/07/23 2046    CBC & Differential [[id number]]  (Abnormal) Collected: 04/07/23 2028    Specimen: Blood Updated: 04/07/23 2054    Narrative:      The following orders were created for panel order CBC & Differential.  Procedure                               Abnormality         Status                     ---------                               -----------         ------                     CBC Auto Differential[[id number]]        Abnormal            Final result                 Please view results for these tests on the individual orders.    Comprehensive Metabolic Panel [[id number]] Collected: 04/07/23 2028    Specimen: Blood Updated: 04/07/23 2114     Glucose 82 mg/dL      BUN 20 mg/dL      Creatinine 0.84 mg/dL      Sodium 140 mmol/L      Potassium 4.3 mmol/L      Chloride 103 mmol/L      CO2 27.3 mmol/L      Calcium 9.1 mg/dL      Total Protein 7.0 g/dL      Albumin 4.5 g/dL      ALT (SGPT) 25 U/L      AST (SGOT) 28 U/L      Alkaline Phosphatase 73 U/L      Total Bilirubin 0.5 mg/dL      Globulin 2.5 gm/dL      A/G Ratio 1.8 g/dL      BUN/Creatinine Ratio 23.8     Anion Gap 9.7 mmol/L      eGFR 121.8 mL/min/1.73     Narrative:      GFR Normal >60  Chronic Kidney Disease <60  Kidney Failure <15      CBC Auto Differential [[id number]]  (Abnormal) Collected: 04/07/23 2028    Specimen: Blood Updated: 04/07/23 2054     WBC 6.71 10*3/mm3      RBC 4.77 10*6/mm3      Hemoglobin 15.3 g/dL      Hematocrit 42.0 %      MCV 88.1 fL      MCH 32.1 pg      MCHC 36.4 g/dL      RDW 12.5 %      RDW-SD 39.8 fl      MPV 10.9 fL      Platelets 142 10*3/mm3      Neutrophil % 72.4 %      Lymphocyte % 15.6 %      Monocyte % 10.4 %      Eosinophil % 1.2 %      Basophil % 0.3 %      Immature Grans % 0.1 %      Neutrophils, Absolute 4.85 10*3/mm3      Lymphocytes, Absolute 1.05 10*3/mm3      Monocytes, Absolute 0.70 10*3/mm3      Eosinophils, Absolute 0.08  10*3/mm3      Basophils, Absolute 0.02 10*3/mm3      Immature Grans, Absolute 0.01 10*3/mm3      nRBC 0.0 /100 WBC            Imaging:    XR Foot 3+ View Right    Result Date: 4/7/2023  PROCEDURE: XR FOOT 3+ VW RIGHT  COMPARISON: None.  INDICATIONS: PATIENT DROPPED STEEL OBJECT ON HIS RIGHT FOOT.  RIGHT FOOT PAIN.  FINDINGS: 3 views were obtained.  No acute fracture or acute malalignment is identified.  No retained radiopaque foreign body is seen.  If symptoms or clinical concerns persist, consider imaging follow-up.       No acute fracture or acute malalignment is identified.     Please note that portions of this note were completed with a voice recognition program.  JIA GIL JR, MD       Electronically Signed and Approved By: JIA GIL JR, MD on 4/07/2023 at 21:52                  Differential Diagnosis and Discussion:      Extremity Pain: Differential diagnosis includes but is not limited to soft tissue sprain, tendonitis, tendon injury, dislocation, fracture, deep vein thrombosis, arterial insufficiency, osteoarthritis, bursitis, and ligamentous damage.  Fever: Based on the complaint of fever, differential diagnosis includes but is not limited to meningitis, pneumonia, pyelonephritis, acute uti,  systemic immune response syndrome, sepsis, viral syndrome, fungal infection, tick born illness and other bacterial infections.  Headache: Differential diagnosis includes but is not limited to migraine, cluster headache, hypertension, tumor, subarachnoid bleeding, pseudotumor cerebri, temporal arteritis, infections, tension headache, and TMJ syndrome.        MDM  Number of Diagnoses or Management Options  Contusion of right foot, initial encounter  URI with cough and congestion  Diagnosis management comments: No acute findings on right foot x-ray.  The patient is resting comfortably and feels better, is alert and in no distress. Influenza and strep swab are negative, COVID test pending.  On re-examination the  patient does not appear toxic and has no meningeal signs (including a negative Kernig and Brudzinski sign), and there's no intractable vomiting, respiratory distress and no apparent pain. Based on the history, exam, diagnostic testing and reassessment, the patient has no signs of meningitis, significant pneumonia, pyelonephritis, sepsis or other acute serious bacterial infections, or other significant pathology to warrant further testing, continued ED treatment, admission or specialist evaluation. The patient's vital signs have been stable. The patient's condition is stable and is appropriate for discharge.  The patient´s symptoms are consistent with a viral syndrome. The patient was counseled to return to the ED for re-evaluation for worsening cough, shortness of breath, uncontrollable headache, uncontrollable fever, altered mental status, or any symptoms which cause them concern. The patient will pursue further outpatient evaluation with the primary care physician or other designated or consultant physician as indicated in the discharge instructions.       Amount and/or Complexity of Data Reviewed  Clinical lab tests: reviewed and ordered  Tests in the radiology section of CPT®: reviewed and ordered    Risk of Complications, Morbidity, and/or Mortality  Presenting problems: low  Diagnostic procedures: minimal  Management options: minimal             Patient Care Considerations:          Consultants/Shared Management Plan:        Social Determinants of Health:    Patient is independent, reliable, and has access to care.       Disposition and Care Coordination:    Discharged: The patient is suitable and stable for discharge with no need for consideration of observation or admission.    I have explained discharge medications and the need for follow up with the patient/caretakers. This was also printed in the discharge instructions. Patient was discharged with the following medications and follow up:      Medication  List      New Prescriptions    brompheniramine-pseudoephedrine-DM 30-2-10 MG/5ML syrup  Take 5 mL by mouth 4 (Four) Times a Day As Needed for Allergies.     guaiFENesin 600 MG 12 hr tablet  Commonly known as: MUCINEX  Take 2 tablets by mouth 2 (Two) Times a Day.           Where to Get Your Medications      These medications were sent to [id number] - [place], [place] - [address] AT [facility] - [phone]  - [phone] FX  [address] [place] [address] [place] [address]    Phone: [phone]   · brompheniramine-pseudoephedrine-DM 30-2-10 MG/5ML syrup  · guaiFENesin 600 MG 12 hr tablet      Kenya De Jesus, APRN  [address] [address] [phone]    Call   As needed       Final diagnoses:   URI with cough and congestion   Contusion of right foot, initial encounter        ED Disposition     ED Disposition   Discharge    Condition   Stable    Comment   --             This medical record created using voice recognition software.           Kaelyn Mcdermott, APRN  04/08/23 0609

## 2023-04-08 NOTE — DISCHARGE INSTRUCTIONS
Drink plenty of fluids.  Take Mucinex with at least 8 ounces of water.  Take Tylenol Motrin for fever or pain.  You can try [place] pot for your nasal congestion.  Salt water gargles can help with your sore throat.    Return for worsening symptoms or any new concerns.

## 2023-04-08 NOTE — ED TRIAGE NOTES
Pt comes to er tonight for flu-like symptoms. Pt states he has felt like he has had a fever. Congestion and headaches. Pt also states that he was here 2 weeks ago with the same symptoms and tested negative for everything.

## 2023-04-10 LAB — BACTERIA SPEC AEROBE CULT: NORMAL

## 2023-04-23 PROCEDURE — 85025 COMPLETE CBC W/AUTO DIFF WBC: CPT | Performed by: FAMILY MEDICINE

## 2023-04-23 PROCEDURE — U0004 COV-19 TEST NON-CDC HGH THRU: HCPCS | Performed by: FAMILY MEDICINE

## 2023-04-24 ENCOUNTER — TELEPHONE (OUTPATIENT)
Dept: URGENT CARE | Facility: CLINIC | Age: 28
End: 2023-04-24
Payer: COMMERCIAL

## 2023-04-24 NOTE — TELEPHONE ENCOUNTER
----- Message from Fabian Fontaine MD sent at 4/23/2023  6:36 PM EDT -----  I left a message for the patient to call back - white count is normal so chance of appendicitis is extremely low - if the pain gets worse he must go to the ER

## 2023-04-24 NOTE — TELEPHONE ENCOUNTER
----- Message from LORI Jensen sent at 4/24/2023  9:31 AM EDT -----  Please notify patient of negative COVID-19 test result.

## 2023-04-25 ENCOUNTER — TELEPHONE (OUTPATIENT)
Dept: URGENT CARE | Facility: CLINIC | Age: 28
End: 2023-04-25
Payer: COMMERCIAL

## 2023-04-25 NOTE — TELEPHONE ENCOUNTER
----- Message from LORI Jensen sent at 4/24/2023  9:31 AM EDT -----  Please notify patient of negative COVID-19 test result.  
ambulatory

## 2023-04-26 ENCOUNTER — TELEPHONE (OUTPATIENT)
Dept: URGENT CARE | Facility: CLINIC | Age: 28
End: 2023-04-26
Payer: COMMERCIAL

## 2023-06-12 ENCOUNTER — APPOINTMENT (OUTPATIENT)
Dept: GENERAL RADIOLOGY | Facility: HOSPITAL | Age: 28
End: 2023-06-12
Payer: COMMERCIAL

## 2023-06-12 VITALS
WEIGHT: 157.41 LBS | BODY MASS INDEX: 23.31 KG/M2 | DIASTOLIC BLOOD PRESSURE: 79 MMHG | SYSTOLIC BLOOD PRESSURE: 156 MMHG | TEMPERATURE: 98.6 F | HEART RATE: 66 BPM | RESPIRATION RATE: 18 BRPM | HEIGHT: 69 IN | OXYGEN SATURATION: 100 %

## 2023-06-12 PROCEDURE — 73130 X-RAY EXAM OF HAND: CPT

## 2023-06-12 PROCEDURE — 73110 X-RAY EXAM OF WRIST: CPT

## 2023-06-12 PROCEDURE — 99283 EMERGENCY DEPT VISIT LOW MDM: CPT

## 2023-06-12 RX ORDER — IBUPROFEN 600 MG/1
600 TABLET ORAL ONCE
Status: DISCONTINUED | OUTPATIENT
Start: 2023-06-12 | End: 2023-06-13

## 2023-06-13 ENCOUNTER — HOSPITAL ENCOUNTER (EMERGENCY)
Facility: HOSPITAL | Age: 28
Discharge: HOME | End: 2023-06-13
Attending: EMERGENCY MEDICINE | Admitting: EMERGENCY MEDICINE
Payer: COMMERCIAL

## 2023-06-13 DIAGNOSIS — M25.531 ACUTE PAIN OF RIGHT WRIST: Primary | ICD-10-CM

## 2023-06-13 NOTE — Clinical Note
[facility] EMERGENCY ROOM  [address]  [place] [address]  Phone: [phone]    James Leo was seen and treated in our emergency department on 6/12/2023.  He may return to work on 06/16/2023.  Have Mr. Silas Leo refrain from excessive and strenuous activity of right wrist due for 3 to 5 days after emergency department discharge.       Thank you for choosing [facility].    Shabbir, Yusra, PA

## 2023-06-13 NOTE — Clinical Note
[facility] EMERGENCY ROOM  [address]  [place] [address]  Phone: [phone]    James Leo was seen and treated in our emergency department on 6/12/2023.  He may return to work on 06/16/2023.  Have Mr. Silas Leo refrain from excessive and strenuous activity of right wrist due for 3 to 5 days after emergency department discharge.       Thank you for choosing [facility].    Shabbir, Yusra, PA

## 2023-06-13 NOTE — Clinical Note
[facility] EMERGENCY ROOM  [address]  [place] [address]  Phone: [phone]    James Leo was seen and treated in our emergency department on 6/12/2023.  He may return to work on 06/16/2023.  Have Mr. Silas Leo refrain from excessive and strenuous activity of right wrist due for 3 to 5 days after emergency department discharge.       Thank you for choosing [facility].    Shabbir, Yusra, PA

## 2023-06-13 NOTE — Clinical Note
[facility] EMERGENCY ROOM  [address]  [place] [address]  Phone: [phone]    James Leo was seen and treated in our emergency department on 6/12/2023.  He may return to work on 06/16/2023.  Have Mr. Silas Leo refrain from excessive and strenuous activity of right wrist due for 3 to 5 days after emergency department discharge.       Thank you for choosing [facility].    Shabbir, Yusra, PA

## 2023-06-13 NOTE — ED PROVIDER NOTES
Time: 10:43 PM EDT  Date of encounter:  6/12/2023  Independent Historian/Clinical History and Information was obtained by:   Patient  Chief Complaint   Patient presents with    Hand Pain     Right     Wrist Pain     Right        History is limited by: N/A    History of Present Illness:  Patient is a 28 y.o. year old male who presents to the emergency department for evaluation of right hand and wrist pain. Denies injury. Patient states began about 3-4 days ago and progressively worsening. No color change. Does report hx of previous joint pain. States he works with his hands daily. No treatment PTA.  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  Patient denies numbness but admits paresthesias in his fingertips.  Patient also admits to decreased  strength and decreased range of motion of right wrist.  Patient denies any recent injuries or prior surgeries to right wrist.  Denies swelling, change in temperature, bruising to right wrist.  Patient denies fever and chills.    HPI    Patient Care Team  Primary Care Provider: Kenya De Jesus APRN    Past Medical History:     No Known Allergies  Past Medical History:   Diagnosis Date    Asthma     Mononucleosis      Past Surgical History:   Procedure Laterality Date    DENTAL PROCEDURE       No family history on file.    Home Medications:  Prior to Admission medications    Medication Sig Start Date End Date Taking? Authorizing Provider   brompheniramine-pseudoephedrine-DM 30-2-10 MG/5ML syrup Take 5 mL by mouth 4 (Four) Times a Day As Needed for Allergies. 4/7/23   Kaelyn Mcdermott APRN   dicyclomine (BENTYL) 20 MG tablet Take 1 tablet by mouth Every 6 (Six) Hours. 1/1/23   Sandra Okeefe APRN   guaiFENesin (MUCINEX) 600 MG 12 hr tablet Take 2 tablets by mouth 2 (Two) Times a Day. 4/7/23   Kaelyn Mcdermott APRN   ibuprofen (ADVIL,MOTRIN) 400 MG tablet Take 1 tablet by mouth Every 6 (Six) Hours As Needed for Mild Pain.    Provider,  "MD Cecilia   promethazine-dextromethorphan (PROMETHAZINE-DM) 6.25-15 MG/5ML syrup Take 5 mL by mouth 4 (Four) Times a Day As Needed for Cough. 4/23/23   Fabian Fontaine MD        Social History:   Social History     Tobacco Use    Smoking status: Never    Smokeless tobacco: Never   Vaping Use    Vaping Use: Never used   Substance Use Topics    Alcohol use: Yes     Comment: OCC    Drug use: Never         Review of Systems:  Review of Systems   Musculoskeletal:  Positive for arthralgias (right hand and wrist pain).   Skin:  Negative for color change.      Physical Exam:  /79 (BP Location: Right arm, Patient Position: Sitting)   Pulse 66   Temp 98.6 °F (37 °C) (Oral)   Resp 18   Ht 175.3 cm (69\")   Wt 71.4 kg (157 lb 6.5 oz)   SpO2 100%   BMI 23.25 kg/m²     Physical Exam  Vitals and nursing note reviewed.   Constitutional:       Appearance: Normal appearance.   HENT:      Head: Normocephalic and atraumatic.      Left Ear: Tympanic membrane normal.      Nose: Nose normal.      Mouth/Throat:      Mouth: Mucous membranes are moist.   Eyes:      Extraocular Movements: Extraocular movements intact.      Conjunctiva/sclera: Conjunctivae normal.      Pupils: Pupils are equal, round, and reactive to light.   Cardiovascular:      Rate and Rhythm: Normal rate and regular rhythm.      Heart sounds: Normal heart sounds.   Pulmonary:      Effort: Pulmonary effort is normal.      Breath sounds: Normal breath sounds. No wheezing.   Chest:      Chest wall: No tenderness.   Abdominal:      General: Abdomen is flat. There is no distension.   Musculoskeletal:         General: Tenderness present. No swelling or deformity.      Right wrist: Tenderness present. No swelling, deformity, lacerations or bony tenderness. Decreased range of motion. Normal pulse.      Right hand: No deformity, tenderness or bony tenderness. Normal range of motion. Decreased strength. Normal sensation. Normal capillary refill. Normal " pulse.      Cervical back: Normal range of motion and neck supple.   Skin:     General: Skin is warm and dry.      Coloration: Skin is not cyanotic.   Neurological:      General: No focal deficit present.      Mental Status: He is alert and oriented to person, place, and time.   Psychiatric:         Attention and Perception: Attention and perception normal.         Mood and Affect: Mood normal.         Behavior: Behavior normal.                Procedures:  Procedures      Medical Decision Making:      Comorbidities that affect care:    Asthma    External Notes reviewed:    Previous Clinic Note: Patient was seen at urgent care on 4/23/2023 for right lower quadrant pain      The following orders were placed and all results were independently analyzed by me:  Orders Placed This Encounter   Procedures    [facility] 06.  Wrist Brace    XR Hand 3+ View Right    XR Wrist 3+ View Right    Obtain & Apply The Following- Upper extremity; Wrist cock-up       Medications Given in the Emergency Department:  Medications   ibuprofen (ADVIL,MOTRIN) tablet 600 mg (has no administration in time range)        ED Course:    The patient was initially evaluated in the triage area where orders were placed. The patient was later dispositioned by Yusra Shabbir, PA.      The patient was advised to stay for completion of workup which includes but is not limited to communication of labs and radiological results, reassessment and plan. The patient was advised that leaving prior to disposition by a provider could result in critical findings that are not communicated to the patient.     ED Course as of 06/13/23 0041   Mon Jun 12, 2023   2240 The patient was seen and examined by Sariah gates APRN, while in triage. Orders placed. Patient is awaiting disposition.   [AR]      ED Course User Index  [AR] Sariah Deluna APRN       Labs:    Lab Results (last 24 hours)       ** No results found for the last 24 hours. **              Imaging:    XR Wrist 3+ View Right    Result Date: 6/13/2023  PROCEDURE: XR WRIST 3+ VW RIGHT  COMPARISON: None.  INDICATIONS: RIGHT WRIST PAIN; NO KNOWN INJURY  FINDINGS:  BONES: No significant arthropathy or acute abnormality.  SOFT TISSUES: No visible soft tissue swelling.  No subcutaneous emphysema.  No retained radiopaque foreign body. EFFUSION: None visible.  OTHER: Negative.  External artifacts are noted.  If symptoms or clinical concerns persist, consider imaging follow-up.        No acute fracture or acute malalignment.      Please note that portions of this note were completed with a voice recognition program.  JIA GIL JR, MD       Electronically Signed and Approved By: JIA GIL JR, MD on 6/13/2023 at 0:12              XR Hand 3+ View Right    Result Date: 6/13/2023  PROCEDURE: XR HAND 3+ VW RIGHT  COMPARISON: 6/12/2023.  INDICATIONS: RIGHT HAND PAIN; NO KNOWN INJURY.  FINDINGS:  BONES: No significant arthropathy or acute abnormality.  SOFT TISSUES: No visible soft tissue swelling.  No subcutaneous emphysema.  No retained radiopaque foreign body. EFFUSION: None visible.  OTHER: Negative.  If symptoms or clinical concerns persist, consider imaging follow-up.        No acute fracture or acute malalignment.       Please note that portions of this note were completed with a voice recognition program.  JIA GIL JR, MD       Electronically Signed and Approved By: JIA GIL JR, MD on 6/13/2023 at 0:15                 Differential Diagnosis and Discussion:      Extremity Pain: Differential diagnosis includes but is not limited to soft tissue sprain, tendonitis, tendon injury, dislocation, fracture, deep vein thrombosis, arterial insufficiency, osteoarthritis, bursitis, and ligamentous damage.    All X-rays impressions were independently interpreted by me.    MDM  Number of Diagnoses or Management Options  Acute pain of right wrist  Diagnosis management comments: Patient arrives  emergency department complaining of right wrist and hand pain.  Patient admits to having repetitive use of right wrist.  Patient denies injuries or prior surgeries to right wrist.  On physical exam patient is tender in his right wrist.  No swelling or erythema noted.  Patient has decree strength of right wrist and hand.  X-ray of right wrist and hand negative for any acute fractures dislocations.  Patient informed his symptoms seem most consistent with carpal tunnel.  Patient told to call and schedule appoint with orthopedics.  Patient given a short course of diclofenac.  Patient placed in wrist brace.  Patient given return precautions.  Patient states understanding.             Patient Care Considerations:    NARCOTICS: I considered prescribing opiate pain medication as an outpatient, however pain control without medical emergency      Consultants/Shared Management Plan:    None    Social Determinants of Health:    Patient is independent, reliable, and has access to care.       Disposition and Care Coordination:    Discharged: The patient is suitable and stable for discharge with no need for consideration of observation or admission.    I have explained the patient´s condition, diagnoses and treatment plan based on the information available to me at this time. I have answered questions and addressed any concerns. The patient has a good  understanding of the patient´s diagnosis, condition, and treatment plan as can be expected at this point. The vital signs have been stable. The patient´s condition is stable and appropriate for discharge from the emergency department.      The patient will pursue further outpatient evaluation with the primary care physician or other designated or consulting physician as outlined in the discharge instructions. They are agreeable to this plan of care and follow-up instructions have been explained in detail. The patient has received these instructions in written format and have  expressed an understanding of the discharge instructions. The patient is aware that any significant change in condition or worsening of symptoms should prompt an immediate return to this or the closest emergency department or call to 1.  I have explained discharge medications and the need for follow up with the patient/caretakers. This was also printed in the discharge instructions. Patient was discharged with the following medications and follow up:      Medication List        New Prescriptions      diclofenac 50 MG EC tablet  Commonly known as: VOLTAREN  Take 1 tablet by mouth 2 (Two) Times a Day.               Where to Get Your Medications        These medications were sent to [id number] - [place], [place] - [address] AT [facility] - [phone] [facility] [phone]   [address] [address] [place] [address]      Phone: [phone]   diclofenac 50 MG EC tablet      Been, Azeem GREY MD  [address] [place] [address]  [place] [id number] [phone]    Schedule an appointment as soon as possible for a visit          Final diagnoses:   Acute pain of right wrist        ED Disposition       ED Disposition   Discharge    Condition   Stable    Comment   --               This medical record created using voice recognition software.             Shabbir, Yusra, PA  06/13/23 0040       Shabbir, Yusra, PA  06/13/23 0041

## 2023-06-13 NOTE — DISCHARGE INSTRUCTIONS
Your x-ray of right wrist and hand were negative for any acute fractures or dislocation.  Your symptoms sound most consistent with carpal tunnel.  Please read the handout provided to you with your discharge paperwork.  I have prescribed a short-term of anti-inflammatories for your wrist, take as prescribed.  Keep your wrist in brace whenever moving around, working, and sleeping.  Call to schedule appointment with orthopedic tomorrow morning, information is listed below.  Return to emergency department if you have severe worsening pain.

## 2023-08-16 ENCOUNTER — OFFICE VISIT (OUTPATIENT)
Dept: INTERNAL MEDICINE | Facility: CLINIC | Age: 28
End: 2023-08-16
Payer: COMMERCIAL

## 2023-08-16 VITALS
HEIGHT: 69 IN | DIASTOLIC BLOOD PRESSURE: 88 MMHG | WEIGHT: 158 LBS | RESPIRATION RATE: 18 BRPM | OXYGEN SATURATION: 98 % | SYSTOLIC BLOOD PRESSURE: 138 MMHG | TEMPERATURE: 97.9 F | BODY MASS INDEX: 23.4 KG/M2 | HEART RATE: 82 BPM

## 2023-08-16 DIAGNOSIS — M54.41 CHRONIC RIGHT-SIDED LOW BACK PAIN WITH RIGHT-SIDED SCIATICA: ICD-10-CM

## 2023-08-16 DIAGNOSIS — G56.01 CARPAL TUNNEL SYNDROME OF RIGHT WRIST: Primary | ICD-10-CM

## 2023-08-16 DIAGNOSIS — G89.29 CHRONIC RIGHT-SIDED LOW BACK PAIN WITH RIGHT-SIDED SCIATICA: ICD-10-CM

## 2023-08-16 DIAGNOSIS — L71.9 ROSACEA: ICD-10-CM

## 2023-08-16 DIAGNOSIS — M25.521 RIGHT ELBOW PAIN: ICD-10-CM

## 2023-08-16 PROCEDURE — 99214 OFFICE O/P EST MOD 30 MIN: CPT | Performed by: NURSE PRACTITIONER

## 2023-08-16 RX ORDER — METRONIDAZOLE 10 MG/G
GEL TOPICAL DAILY
Qty: 55 G | Refills: 5 | Status: SHIPPED | OUTPATIENT
Start: 2023-08-16

## 2023-08-16 NOTE — PROGRESS NOTES
"Chief Complaint  referrals (Hand, really bad carpal tunnel, for about 3 years, right hand./Dermatology/back), back pain (Lower back pain for the past 6-7 years, NKI. ), and Elbow Injury (Hurt it a couple years ago while arm wrestling, never got it looked at or heal up properly. Still hurting. Right elbow. )    Subjective          James Leo presents to [facility]  History of Present Illness  Right wrist pain x3 years. Uses a lot of power tools, having pain with repetitive motions. He reports numbness/tingling. Has been told that he has carpal tunnel. Difficulty holding objects during the day. Often bother him at night. Has tried a wrist splint.     He reports hurting his right elbow 4 years ago. Was arm wrestling at that time. He reports hearing a \"pop\". Had imaging at that time, xray in ER. He reports having ongoing pain since that time. He reports pain with grasping and lifting things. No pain at rest. Pain at base of humerus and AC fossa.    He reports low back pain for many years. He reports doing heavy lifting with his job for years. He denies having previous imaging on low back. Some sciatic pain. Doing stretches regularly but causing more pain. Pain into right hip at times. Has tried chiropractor. Never tried PT    Reports having ongoing issues with acne for years. He has tried otc, proactive, Neutrogena.     Objective   Vital Signs:   /88   Pulse 82   Temp 97.9 øF (36.6 øC)   Resp 18   Ht 175.3 cm (69.02\")   Wt 71.7 kg (158 lb)   SpO2 98%   BMI 23.32 kg/mý     Physical Exam  Vitals and nursing note reviewed.   Constitutional:       General: He is not in acute distress.     Appearance: Normal appearance.   HENT:      Head: Normocephalic and atraumatic.      Right Ear: External ear normal.      Left Ear: External ear normal.      Nose: Nose normal.      Mouth/Throat:      Mouth: Mucous membranes are moist.   Eyes:      Conjunctiva/sclera: " Conjunctivae normal.   Cardiovascular:      Rate and Rhythm: Normal rate and regular rhythm.      Pulses: Normal pulses.      Heart sounds: Normal heart sounds. No murmur heard.    No friction rub. No gallop.   Pulmonary:      Effort: Pulmonary effort is normal. No respiratory distress.      Breath sounds: No wheezing, rhonchi or rales.   Musculoskeletal:      Right elbow: Normal range of motion. No tenderness.      Right wrist: No tenderness, snuff box tenderness or crepitus.      Cervical back: Neck supple.      Comments: +Phalen's, right   Skin:     General: Skin is warm and dry.   Neurological:      General: No focal deficit present.      Mental Status: He is alert and oriented to person, place, and time.   Psychiatric:         Mood and Affect: Mood normal.         Behavior: Behavior normal.      Result Review :          Procedures      Assessment and Plan    Diagnoses and all orders for this visit:    1. Carpal tunnel syndrome of right wrist (Primary)  -     Ambulatory Referral to Orthopedic Surgery    2. Right elbow pain  Comments:  sending to ortho for eval of wrist and elbow pain  Orders:  -     Ambulatory Referral to Orthopedic Surgery    3. Chronic right-sided low back pain with right-sided sciatica  Comments:  will get xray and send for PT  Orders:  -     XR Spine Lumbar 4+ View (In Office)  -     Ambulatory Referral to Physical Therapy Evaluate and treat    4. Rosacea  Comments:  will try metrogel and derm referral. discussed common triggers and skin care  Orders:  -     Ambulatory Referral to Dermatology    Other orders  -     metroNIDAZOLE (Metrogel) 1 % gel; Apply  topically to the appropriate area as directed Daily.  Dispense: 55 g; Refill: 5              Follow Up   Return in about 6 weeks (around 9/27/2023).  Patient was given instructions and counseling regarding his condition or for health maintenance advice. Please see specific information pulled into the AVS if appropriate.

## 2023-09-06 ENCOUNTER — APPOINTMENT (OUTPATIENT)
Dept: GENERAL RADIOLOGY | Facility: HOSPITAL | Age: 28
End: 2023-09-06
Payer: COMMERCIAL

## 2023-09-06 ENCOUNTER — HOSPITAL ENCOUNTER (EMERGENCY)
Facility: HOSPITAL | Age: 28
Discharge: HOME | End: 2023-09-06
Attending: EMERGENCY MEDICINE
Payer: COMMERCIAL

## 2023-09-06 VITALS
HEART RATE: 77 BPM | DIASTOLIC BLOOD PRESSURE: 96 MMHG | OXYGEN SATURATION: 99 % | HEIGHT: 69 IN | SYSTOLIC BLOOD PRESSURE: 110 MMHG | BODY MASS INDEX: 23.51 KG/M2 | RESPIRATION RATE: 18 BRPM | TEMPERATURE: 99.1 F | WEIGHT: 158.73 LBS

## 2023-09-06 DIAGNOSIS — U07.1 COVID-19: Primary | ICD-10-CM

## 2023-09-06 LAB
ALBUMIN SERPL-MCNC: 4.7 G/DL (ref 3.5–5.2)
ALBUMIN/GLOB SERPL: 1.9 G/DL
ALP SERPL-CCNC: 76 U/L (ref 39–117)
ALT SERPL W P-5'-P-CCNC: 24 U/L (ref 1–41)
ANION GAP SERPL CALCULATED.3IONS-SCNC: 12.1 MMOL/L (ref 5–15)
AST SERPL-CCNC: 23 U/L (ref 1–40)
BASOPHILS # BLD AUTO: 0.01 10*3/MM3 (ref 0–0.2)
BASOPHILS NFR BLD AUTO: 0.2 % (ref 0–1.5)
BILIRUB SERPL-MCNC: 0.4 MG/DL (ref 0–1.2)
BUN SERPL-MCNC: 21 MG/DL (ref 6–20)
BUN/CREAT SERPL: 21 (ref 7–25)
CALCIUM SPEC-SCNC: 9.5 MG/DL (ref 8.6–10.5)
CHLORIDE SERPL-SCNC: 105 MMOL/L (ref 98–107)
CO2 SERPL-SCNC: 24.9 MMOL/L (ref 22–29)
CREAT SERPL-MCNC: 1 MG/DL (ref 0.76–1.27)
DEPRECATED RDW RBC AUTO: 38.3 FL (ref 37–54)
EGFRCR SERPLBLD CKD-EPI 2021: 104.5 ML/MIN/1.73
EOSINOPHIL # BLD AUTO: 0.02 10*3/MM3 (ref 0–0.4)
EOSINOPHIL NFR BLD AUTO: 0.3 % (ref 0.3–6.2)
ERYTHROCYTE [DISTWIDTH] IN BLOOD BY AUTOMATED COUNT: 12 % (ref 12.3–15.4)
FLUAV AG NPH QL: NEGATIVE
FLUBV AG NPH QL IA: NEGATIVE
GLOBULIN UR ELPH-MCNC: 2.5 GM/DL
GLUCOSE SERPL-MCNC: 136 MG/DL (ref 65–99)
HCT VFR BLD AUTO: 42.5 % (ref 37.5–51)
HGB BLD-MCNC: 15.5 G/DL (ref 13–17.7)
HOLD SPECIMEN: NORMAL
HOLD SPECIMEN: NORMAL
IMM GRANULOCYTES # BLD AUTO: 0.02 10*3/MM3 (ref 0–0.05)
IMM GRANULOCYTES NFR BLD AUTO: 0.3 % (ref 0–0.5)
LIPASE SERPL-CCNC: 30 U/L (ref 13–60)
LYMPHOCYTES # BLD AUTO: 0.95 10*3/MM3 (ref 0.7–3.1)
LYMPHOCYTES NFR BLD AUTO: 15.7 % (ref 19.6–45.3)
MAGNESIUM SERPL-MCNC: 2.1 MG/DL (ref 1.6–2.6)
MCH RBC QN AUTO: 31.7 PG (ref 26.6–33)
MCHC RBC AUTO-ENTMCNC: 36.5 G/DL (ref 31.5–35.7)
MCV RBC AUTO: 86.9 FL (ref 79–97)
MONOCYTES # BLD AUTO: 0.45 10*3/MM3 (ref 0.1–0.9)
MONOCYTES NFR BLD AUTO: 7.4 % (ref 5–12)
NEUTROPHILS NFR BLD AUTO: 4.62 10*3/MM3 (ref 1.7–7)
NEUTROPHILS NFR BLD AUTO: 76.1 % (ref 42.7–76)
NRBC BLD AUTO-RTO: 0 /100 WBC (ref 0–0.2)
NT-PROBNP SERPL-MCNC: <36 PG/ML (ref 0–450)
PLATELET # BLD AUTO: 191 10*3/MM3 (ref 140–450)
PMV BLD AUTO: 10.3 FL (ref 6–12)
POTASSIUM SERPL-SCNC: 4.1 MMOL/L (ref 3.5–5.2)
PROT SERPL-MCNC: 7.2 G/DL (ref 6–8.5)
QT INTERVAL: 363 MS
QTC INTERVAL: 421 MS
RBC # BLD AUTO: 4.89 10*6/MM3 (ref 4.14–5.8)
SARS-COV-2 RNA RESP QL NAA+PROBE: DETECTED
SODIUM SERPL-SCNC: 142 MMOL/L (ref 136–145)
TROPONIN T SERPL HS-MCNC: <6 NG/L
TSH SERPL DL<=0.05 MIU/L-ACNC: 1.24 UIU/ML (ref 0.27–4.2)
WBC NRBC COR # BLD: 6.07 10*3/MM3 (ref 3.4–10.8)
WHOLE BLOOD HOLD COAG: NORMAL
WHOLE BLOOD HOLD SPECIMEN: NORMAL

## 2023-09-06 PROCEDURE — 80053 COMPREHEN METABOLIC PANEL: CPT

## 2023-09-06 PROCEDURE — 84443 ASSAY THYROID STIM HORMONE: CPT

## 2023-09-06 PROCEDURE — 71045 X-RAY EXAM CHEST 1 VIEW: CPT

## 2023-09-06 PROCEDURE — 99284 EMERGENCY DEPT VISIT MOD MDM: CPT

## 2023-09-06 PROCEDURE — 87804 INFLUENZA ASSAY W/OPTIC: CPT

## 2023-09-06 PROCEDURE — 85025 COMPLETE CBC W/AUTO DIFF WBC: CPT

## 2023-09-06 PROCEDURE — 93005 ELECTROCARDIOGRAM TRACING: CPT

## 2023-09-06 PROCEDURE — 93005 ELECTROCARDIOGRAM TRACING: CPT | Performed by: EMERGENCY MEDICINE

## 2023-09-06 PROCEDURE — 87635 SARS-COV-2 COVID-19 AMP PRB: CPT | Performed by: EMERGENCY MEDICINE

## 2023-09-06 PROCEDURE — 84484 ASSAY OF TROPONIN QUANT: CPT

## 2023-09-06 PROCEDURE — 83690 ASSAY OF LIPASE: CPT

## 2023-09-06 PROCEDURE — 36415 COLL VENOUS BLD VENIPUNCTURE: CPT

## 2023-09-06 PROCEDURE — 83880 ASSAY OF NATRIURETIC PEPTIDE: CPT

## 2023-09-06 PROCEDURE — 83735 ASSAY OF MAGNESIUM: CPT

## 2023-09-06 RX ORDER — ASPIRIN 81 MG/1
324 TABLET, CHEWABLE ORAL ONCE
Status: DISCONTINUED | OUTPATIENT
Start: 2023-09-06 | End: 2023-09-06

## 2023-09-06 RX ORDER — SODIUM CHLORIDE 0.9 % (FLUSH) 0.9 %
10 SYRINGE (ML) INJECTION AS NEEDED
Status: DISCONTINUED | OUTPATIENT
Start: 2023-09-06 | End: 2023-09-06 | Stop reason: HOSPADM

## 2023-09-06 RX ORDER — KETOROLAC TROMETHAMINE 30 MG/ML
30 INJECTION, SOLUTION INTRAMUSCULAR; INTRAVENOUS ONCE
Status: DISCONTINUED | OUTPATIENT
Start: 2023-09-06 | End: 2023-09-06 | Stop reason: HOSPADM

## 2023-09-06 NOTE — Clinical Note
[facility] EMERGENCY ROOM  [address]  [place] [address]  Phone: [phone]    James Leo was seen and treated in our emergency department on 9/6/2023.  He may return to work on 09/09/2023.         Thank you for choosing [facility].    Madi Galvan PA-C

## 2023-09-06 NOTE — DISCHARGE INSTRUCTIONS
you tested positive for COVID    Your flu swab was negative, all your cardiac markers were within normal, your chest x-ray was normal, your EKG was normal.    Drink plenty of fluids, rest, take OTC tylenol/ibuprofen as needed for headache/body aches/fever.  Use your inhaler if needed for shortness of breath    Follow CDC guideline

## 2023-09-06 NOTE — ED PROVIDER NOTES
Time: 5:25 PM EDT  Date of encounter:  9/6/2023  Independent Historian/Clinical History and Information was obtained by:   Patient    History is limited by: N/A    Chief Complaint   Patient presents with    Chest Pain    Nausea     Patient reports cp that started last night, patient rates the pain 7/10. Patient reports it is midsternal to the right pain that does not radiate. Patient reports nausea accompanies the pain. Has been sick recently, headache, fever chills and nausea.         History of Present Illness:  Patient is a 28 y.o. year old male who presents to the emergency department for evaluation of sided chest pain and hard to breathe that started last week.  Patient describes his chest pain as a stabbing and rates it a 7 out of 10.  Patient states that he was sick last week.  He went to urgent care where he was tested for COVID, strep and flu all negative.  He was exposed to COVID last week.  Patient states that he can feel his heart thumping but it is not fast.  Patient has a history of asthma but does not use his inhaler.  He denies recent travel, cough, fever, nausea and vomiting.    HPI    Patient Care Team  Primary Care Provider: Kenya De Jesus APRN    Past Medical History:     No Known Allergies  Past Medical History:   Diagnosis Date    Asthma     Mononucleosis      Past Surgical History:   Procedure Laterality Date    DENTAL PROCEDURE       History reviewed. No pertinent family history.    Home Medications:  Prior to Admission medications    Medication Sig Start Date End Date Taking? Authorizing Provider   acetaminophen (TYLENOL) 500 MG tablet Take 1 tablet by mouth Every 6 (Six) Hours As Needed for Mild Pain. 8/18/23   Susan Barth MD   metroNIDAZOLE (Metrogel) 1 % gel Apply  topically to the appropriate area as directed Daily. 8/16/23   Kenya De Jesus APRN   naproxen (Naprosyn) 500 MG tablet Take 1 tablet by mouth 2 (Two) Times a Day With Meals. 8/18/23   Susan Barth MD   ondansetron  "(ZOFRAN) 4 MG tablet Take 1 tablet by mouth Every 8 (Eight) Hours As Needed for Nausea or Vomiting. 8/18/23   Susan Barth MD        Social History:   Social History     Tobacco Use    Smoking status: Never    Smokeless tobacco: Never   Vaping Use    Vaping Use: Never used   Substance Use Topics    Alcohol use: Yes     Comment: OCC    Drug use: Never         Review of Systems:  Review of Systems   Constitutional: Negative.  Negative for fever.   HENT: Negative.     Eyes: Negative.    Respiratory:  Positive for shortness of breath. Negative for cough.    Cardiovascular:  Positive for chest pain and palpitations.   Gastrointestinal: Negative.  Negative for nausea and vomiting.   Endocrine: Negative.    Genitourinary: Negative.    Musculoskeletal: Negative.    Skin: Negative.    Allergic/Immunologic: Negative.    Neurological: Negative.    Hematological: Negative.    Psychiatric/Behavioral: Negative.        Physical Exam:  /69 (BP Location: Left arm, Patient Position: Lying)   Pulse 76   Temp 99.1 °F (37.3 °C) (Oral)   Resp 18   Ht 175.3 cm (69.02\")   Wt 72 kg (158 lb 11.7 oz)   SpO2 98%   BMI 23.43 kg/m²         Physical Exam  Vitals and nursing note reviewed.   Constitutional:       General: He is not in acute distress.     Appearance: Normal appearance. He is normal weight.   HENT:      Head: Normocephalic and atraumatic.      Nose: Nose normal.      Mouth/Throat:      Mouth: Mucous membranes are moist.   Eyes:      Extraocular Movements: Extraocular movements intact.      Conjunctiva/sclera: Conjunctivae normal.      Pupils: Pupils are equal, round, and reactive to light.   Cardiovascular:      Rate and Rhythm: Normal rate and regular rhythm.      Heart sounds: Normal heart sounds.   Pulmonary:      Effort: Pulmonary effort is normal. No respiratory distress.      Breath sounds: Normal breath sounds. No stridor. No wheezing.   Chest:      Chest wall: Tenderness (Right side) present. "   Musculoskeletal:         General: Normal range of motion.      Cervical back: Normal range of motion and neck supple.   Skin:     General: Skin is warm and dry.   Neurological:      General: No focal deficit present.      Mental Status: He is alert and oriented to person, place, and time.   Psychiatric:         Mood and Affect: Mood normal.         Behavior: Behavior normal.              Procedures:  Procedures      Medical Decision Making:      Comorbidities that affect care:    Asthma    External Notes reviewed:    Previous Clinic Note: Urgent care visit from August 18 where he was diagnosed with a viral illness      The following orders were placed and all results were independently analyzed by me:  Orders Placed This Encounter   Procedures    COVID PRE-OP / PRE-PROCEDURE SCREENING ORDER (NO ISOLATION) - Swab, Nasopharynx    Influenza Antigen, Rapid - Swab, Nasopharynx    COVID-19,CEPHEID/PREMA,COR/SADIQ/PAD/EUFEMIA/MAD IN-HOUSE(OR EMERGENT/ADD-ON),NP SWAB IN TRANSPORT MEDIA 3-4 HR TAT, RT-PCR - Swab, Nasopharynx    XR Chest 1 View    [place] Draw    High Sensitivity Troponin T    Comprehensive Metabolic Panel    Lipase    BNP    Magnesium    CBC Auto Differential    TSH Rfx On Abnormal To Free T4    NPO Diet NPO Type: Strict NPO    Undress & Gown    Continuous Pulse Oximetry    Oxygen Therapy- Nasal Cannula; Titrate 1-6 LPM Per SpO2; 90 - 95%    ECG 12 Lead ED Triage Standing Order; Chest Pain    Insert Peripheral IV    CBC & Differential    Green Top (Gel)    Lavender Top    Gold Top - SST    Light Blue Top       Medications Given in the Emergency Department:  Medications   sodium chloride 0.9 % flush 10 mL (has no administration in time range)   ketorolac (TORADOL) injection 30 mg (has no administration in time range)        ED Course:    The patient was initially evaluated in the triage area where orders were placed. The patient was later dispositioned by Madi Galvan PA-C.      The patient was advised to  stay for completion of workup which includes but is not limited to communication of labs and radiological results, reassessment and plan. The patient was advised that leaving prior to disposition by a provider could result in critical findings that are not communicated to the patient.          Labs:    Lab Results (last 24 hours)       Procedure Component Value Units Date/Time    High Sensitivity Troponin T [[id number]]  (Normal) Collected: 09/06/23 1615    Specimen: Blood Updated: 09/06/23 1648     HS Troponin T <6 ng/L     Narrative:      High Sensitive Troponin T Reference Range:  <10.0 ng/L- Negative Female for AMI  <15.0 ng/L- Negative Male for AMI  >=10 - Abnormal Female indicating possible myocardial injury.  >=15 - Abnormal Male indicating possible myocardial injury.   Clinicians would have to utilize clinical acumen, EKG, Troponin, and serial changes to determine if it is an Acute Myocardial Infarction or myocardial injury due to an underlying chronic condition.         CBC & Differential [[id number]]  (Abnormal) Collected: 09/06/23 1615    Specimen: Blood Updated: 09/06/23 1628    Narrative:      The following orders were created for panel order CBC & Differential.  Procedure                               Abnormality         Status                     ---------                               -----------         ------                     CBC Auto Differential[[id number]]        Abnormal            Final result                 Please view results for these tests on the individual orders.    Comprehensive Metabolic Panel [[id number]]  (Abnormal) Collected: 09/06/23 1615    Specimen: Blood Updated: 09/06/23 1648     Glucose 136 mg/dL      BUN 21 mg/dL      Creatinine 1.00 mg/dL      Sodium 142 mmol/L      Potassium 4.1 mmol/L      Chloride 105 mmol/L      CO2 24.9 mmol/L      Calcium 9.5 mg/dL      Total Protein 7.2 g/dL      Albumin 4.7 g/dL      ALT (SGPT) 24 U/L      AST (SGOT) 23 U/L      Alkaline Phosphatase  76 U/L      Total Bilirubin 0.4 mg/dL      Globulin 2.5 gm/dL      A/G Ratio 1.9 g/dL      BUN/Creatinine Ratio 21.0     Anion Gap 12.1 mmol/L      eGFR 104.5 mL/min/1.73     Narrative:      GFR Normal >60  Chronic Kidney Disease <60  Kidney Failure <15      Lipase [[id number]]  (Normal) Collected: 09/06/23 1615    Specimen: Blood Updated: 09/06/23 1648     Lipase 30 U/L     BNP [[id number]]  (Normal) Collected: 09/06/23 1615    Specimen: Blood Updated: 09/06/23 1645     proBNP <36.0 pg/mL     Narrative:      Among patients with dyspnea, NT-proBNP is highly sensitive for the detection of acute congestive heart failure. In addition NT-proBNP of <300 pg/ml effectively rules out acute congestive heart failure with 99% negative predictive value.      Magnesium [[id number]]  (Normal) Collected: 09/06/23 1615    Specimen: Blood Updated: 09/06/23 1648     Magnesium 2.1 mg/dL     CBC Auto Differential [[id number]]  (Abnormal) Collected: 09/06/23 1615    Specimen: Blood Updated: 09/06/23 1628     WBC 6.07 10*3/mm3      RBC 4.89 10*6/mm3      Hemoglobin 15.5 g/dL      Hematocrit 42.5 %      MCV 86.9 fL      MCH 31.7 pg      MCHC 36.5 g/dL      RDW 12.0 %      RDW-SD 38.3 fl      MPV 10.3 fL      Platelets 191 10*3/mm3      Neutrophil % 76.1 %      Lymphocyte % 15.7 %      Monocyte % 7.4 %      Eosinophil % 0.3 %      Basophil % 0.2 %      Immature Grans % 0.3 %      Neutrophils, Absolute 4.62 10*3/mm3      Lymphocytes, Absolute 0.95 10*3/mm3      Monocytes, Absolute 0.45 10*3/mm3      Eosinophils, Absolute 0.02 10*3/mm3      Basophils, Absolute 0.01 10*3/mm3      Immature Grans, Absolute 0.02 10*3/mm3      nRBC 0.0 /100 WBC     TSH Rfx On Abnormal To Free T4 [[id number]]  (Normal) Collected: 09/06/23 1615    Specimen: Blood Updated: 09/06/23 1752     TSH 1.240 uIU/mL     COVID PRE-OP / PRE-PROCEDURE SCREENING ORDER (NO ISOLATION) - Swab, Nasopharynx [[id number]]  (Abnormal) Collected: 09/06/23 3948    Specimen: Swab from  Nasopharynx Updated: 09/06/23 1803    Narrative:      The following orders were created for panel order COVID PRE-OP / PRE-PROCEDURE SCREENING ORDER (NO ISOLATION) - Swab, Nasopharynx.  Procedure                               Abnormality         Status                     ---------                               -----------         ------                     COVID-19,CEPHEID/PREMA,CO...[[id number]]  Abnormal            Final result                 Please view results for these tests on the individual orders.    Influenza Antigen, Rapid - Swab, Nasopharynx [[id number]]  (Normal) Collected: 09/06/23 1619    Specimen: Swab from Nasopharynx Updated: 09/06/23 1710     Influenza A Ag, EIA Negative     Influenza B Ag, EIA Negative    COVID-19,CEPHEID/PREMA,COR/SADIQ/PAD/EUFEMIA/MAD IN-HOUSE(OR EMERGENT/ADD-ON),NP SWAB IN TRANSPORT MEDIA 3-4 HR TAT, RT-PCR - Swab, Nasopharynx [[id number]]  (Abnormal) Collected: 09/06/23 1619    Specimen: Swab from Nasopharynx Updated: 09/06/23 1803     COVID19 Detected    Narrative:      Fact sheet for providers: https://www.fda.gov/media/323837/download     Fact sheet for patients: https://www.fda.gov/media/075464/download  Fact sheet for providers: https://www.fda.gov/media/200549/download     Fact sheet for patients: https://www.fda.gov/media/013154/download             Imaging:    XR Chest 1 View    Result Date: 9/6/2023  PROCEDURE: XR CHEST 1 VW  COMPARISON: [facility], , XR CHEST 2 VW, 1/01/2023, 19:18.  INDICATIONS: CHEST PAIN; NAUSEA  FINDINGS:  Lungs normally expanded.  Heart size normal.  No pneumothorax or pleural effusion.  No focal pulmonary parenchymal opacity.  Pulmonary vessels are distinct.  Bones and soft tissues are normal.       No acute cardiopulmonary abnormality.       TAWANA QURESHI MD       Electronically Signed and Approved By: TAWANA QURESHI MD on 9/06/2023 at 16:45                Differential Diagnosis and Discussion:      Chest Pain:  Based on the patient's signs  and symptoms, I considered aortic dissection, myocardial infaction, pulmonary embolism, cardiac tamponade, pericarditis, pneumothorax, musculoskeletal chest pain and other differential diagnosis as an etiology of the patient's chest pain.   Dyspnea: Differential diagnosis includes but is not limited to metabolic acidosis, neurological disorders, psychogenic, asthma, pneumothorax, upper airway obstruction, COPD, pneumonia, noncardiogenic pulmonary edema, interstitial lung disease, anemia, congestive heart failure, and pulmonary embolism    All labs were reviewed and interpreted by me.  All X-rays impressions were independently interpreted by me.    MDM     Amount and/or Complexity of Data Reviewed  Clinical lab tests: reviewed  Tests in the radiology section of CPT®: reviewed  Tests in the medicine section of CPT®: reviewed             Patient Care Considerations:    CT CHEST: I considered ordering a CT scan of the chest, however x-ray of the chest was normal.  Patient is not tachycardic no concern for PE      Consultants/Shared Management Plan:    None    Social Determinants of Health:    Patient is independent, reliable, and has access to care.       Disposition and Care Coordination:    Discharged: The patient is suitable and stable for discharge with no need for consideration of observation or admission.    I have explained the patient´s condition, diagnoses and treatment plan based on the information available to me at this time. I have answered questions and addressed any concerns. The patient has a good  understanding of the patient´s diagnosis, condition, and treatment plan as can be expected at this point. The vital signs have been stable. The patient´s condition is stable and appropriate for discharge from the emergency department.      The patient will pursue further outpatient evaluation with the primary care physician or other designated or consulting physician as outlined in the discharge instructions.  They are agreeable to this plan of care and follow-up instructions have been explained in detail. The patient has received these instructions in written format and have expressed an understanding of the discharge instructions. The patient is aware that any significant change in condition or worsening of symptoms should prompt an immediate return to this or the closest emergency department or call to 911.  I have explained discharge medications and the need for follow up with the patient/caretakers. This was also printed in the discharge instructions. Patient was discharged with the following medications and follow up:      Medication List      No changes were made to your prescriptions during this visit.      No follow-up provider specified.     Final diagnoses:   COVID-19        ED Disposition       ED Disposition   Discharge    Condition   Stable    Comment   --               This medical record created using voice recognition software.             Madi Galvan PA-C  09/06/23 8282

## 2023-09-20 LAB
QT INTERVAL: 363 MS
QTC INTERVAL: 421 MS

## 2024-02-26 VITALS
BODY MASS INDEX: 22.79 KG/M2 | DIASTOLIC BLOOD PRESSURE: 82 MMHG | WEIGHT: 154.32 LBS | SYSTOLIC BLOOD PRESSURE: 133 MMHG | RESPIRATION RATE: 18 BRPM | OXYGEN SATURATION: 100 % | TEMPERATURE: 97.7 F | HEART RATE: 83 BPM

## 2024-02-26 PROCEDURE — 99283 EMERGENCY DEPT VISIT LOW MDM: CPT

## 2024-02-26 PROCEDURE — 87637 SARSCOV2&INF A&B&RSV AMP PRB: CPT

## 2024-02-27 ENCOUNTER — HOSPITAL ENCOUNTER (EMERGENCY)
Facility: HOSPITAL | Age: 29
Discharge: LEFT BEFORE DISCHARGE | End: 2024-02-27
Attending: EMERGENCY MEDICINE | Admitting: EMERGENCY MEDICINE
Payer: MEDICAID

## 2024-02-27 DIAGNOSIS — Z53.21 ELOPED FROM EMERGENCY DEPARTMENT: Primary | ICD-10-CM

## 2024-02-27 LAB
FLUAV SUBTYP SPEC NAA+PROBE: NOT DETECTED
FLUBV RNA ISLT QL NAA+PROBE: NOT DETECTED
RSV RNA NPH QL NAA+NON-PROBE: NOT DETECTED
SARS-COV-2 RNA RESP QL NAA+PROBE: NOT DETECTED

## 2024-02-27 NOTE — ED PROVIDER NOTES
Time: 11:28 PM EST  Date of encounter:  2/26/2024  Independent Historian/Clinical History and Information was obtained by:   Patient    History is limited by: N/A    Chief Complaint   Patient presents with    Cough     Congestion and body aches x 2 days.          History of Present Illness:  Patient is a 28 y.o. year old male who presents to the emergency department for evaluation of cough and congestion x 2 days.  Denies exposure to illness.    Patient Care Team  Primary Care Provider: Kenya De Jesus APRN    Past Medical History:     No Known Allergies  Past Medical History:   Diagnosis Date    Asthma     Mononucleosis      Past Surgical History:   Procedure Laterality Date    DENTAL PROCEDURE       No family history on file.    Home Medications:  Prior to Admission medications    Medication Sig Start Date End Date Taking? Authorizing Provider   acetaminophen (TYLENOL) 500 MG tablet Take 2 tablets by mouth Every 6 (Six) Hours As Needed for Mild Pain or Moderate Pain. 1/9/24   Vivian Cruz PA-C   metroNIDAZOLE (Metrogel) 1 % gel Apply  topically to the appropriate area as directed Daily. 8/16/23   Kenya De Jesus APRN   naproxen (Naprosyn) 500 MG tablet Take 1 tablet by mouth 2 (Two) Times a Day With Meals. 8/18/23   Susan Barth MD   ondansetron ODT (ZOFRAN-ODT) 4 MG disintegrating tablet Place 1 tablet on the tongue 4 (Four) Times a Day As Needed for Nausea. 10/3/23   Fabian Fontaine MD   promethazine-dextromethorphan (PROMETHAZINE-DM) 6.25-15 MG/5ML syrup Take 5 mL by mouth 4 (Four) Times a Day As Needed for Cough. 1/9/24   Vivian Cruz PA-C   pseudoephedrine-guaifenesin (Mucinex D)  MG per 12 hr tablet Take 1 tablet by mouth 2 (Two) Times a Day As Needed for Congestion or Cough. 1/9/24   Vivian Cruz PA-C        Social History:   Social History     Tobacco Use    Smoking status: Never     Passive exposure: Never    Smokeless tobacco: Never   Vaping Use    Vaping Use: Never used    Passive  vaping exposure: Yes   Substance Use Topics    Alcohol use: Yes     Comment: OCC    Drug use: Never         Review of Systems:  Review of Systems   HENT:  Positive for congestion.    Respiratory:  Positive for cough.         Physical Exam:  /82 (BP Location: Left arm, Patient Position: Sitting)   Pulse 83   Temp 97.7 °F (36.5 °C) (Oral)   Resp 18   Wt 70 kg (154 lb 5.2 oz)   SpO2 100%   BMI 22.79 kg/m²         Physical Exam  Constitutional:       Appearance: Normal appearance.   HENT:      Head: Normocephalic.   Eyes:      Extraocular Movements: Extraocular movements intact.      Conjunctiva/sclera: Conjunctivae normal.   Pulmonary:      Effort: Pulmonary effort is normal.   Abdominal:      General: There is no distension.   Skin:     General: Skin is warm.      Coloration: Skin is not cyanotic.   Neurological:      Mental Status: He is alert and oriented to person, place, and time.   Psychiatric:         Attention and Perception: Attention and perception normal.         Mood and Affect: Mood normal.                      Procedures:  Procedures      Medical Decision Making:      Comorbidities that affect care:    Asthma    External Notes reviewed:          The following orders were placed and all results were independently analyzed by me:  Orders Placed This Encounter   Procedures    COVID-19, FLU A/B, RSV PCR 1 HR TAT - Swab, Nasopharynx       Medications Given in the Emergency Department:  Medications - No data to display     ED Course:    The patient was initially evaluated in the triage area where orders were placed. The patient was later dispositioned by Ramirez Parnell PA-C.      The patient was advised to stay for completion of workup which includes but is not limited to communication of labs and radiological results, reassessment and plan. The patient was advised that leaving prior to disposition by a provider could result in critical findings that are not communicated to the patient.     ED  Course as of 02/27/24 0125   Mon Feb 26, 2024   3149 --- PROVIDER IN TRIAGE NOTE ---    The patient was evaluated by Madi gates in triage. Orders were placed and the patient is currently awaiting disposition.    [AJ]      ED Course User Index  [AJ] Madi Galvan PA-C       Labs:    Lab Results (last 24 hours)       Procedure Component Value Units Date/Time    COVID-19, FLU A/B, RSV PCR 1 HR TAT - Swab, Nasopharynx [[id number]]  (Normal) Collected: 02/26/24 2332    Specimen: Swab from Nasopharynx Updated: 02/27/24 0026     COVID19 Not Detected     Influenza A PCR Not Detected     Influenza B PCR Not Detected     RSV, PCR Not Detected    Narrative:      Fact sheet for providers: https://www.fda.gov/media/856075/download    Fact sheet for patients: https://www.[employer].gov/media/902586/download    Test performed by PCR.             Imaging:    No Radiology Exams Resulted Within Past 24 Hours      Differential Diagnosis and Discussion:      Cough: Differential diagnosis includes but is not limited to pneumonia, acute bronchitis, upper respiratory infection, ACE inhibitor use, allergic reaction, epiglottitis, seasonal allergies, chemical irritants, exercise-induced asthma, viral syndrome.        MDM     Patient eloped from the emergency department.          Patient Care Considerations:          Consultants/Shared Management Plan:        Social Determinants of Health:          Disposition and Care Coordination:    Eloped: This patient has left the emergency department or waiting room with no communication to myself, nursing or administrative staff. There was no opportunity to discuss the patient's decision to leave, provide medical advice or discuss alternatives to. The staff has made efforts to locate patient without success.        Final diagnoses:   Eloped from emergency department        ED Disposition       ED Disposition   Eloped    Condition   --    Comment   --               This medical record created  using voice recognition software.             Ramirez Parnell PA-C  02/27/24 0126

## 2024-02-28 PROBLEM — B96.89 ACUTE BACTERIAL RHINOSINUSITIS: Status: ACTIVE | Noted: 2024-02-28

## 2024-02-28 PROBLEM — J01.90 ACUTE BACTERIAL RHINOSINUSITIS: Status: ACTIVE | Noted: 2024-02-28

## 2024-08-30 ENCOUNTER — HOSPITAL ENCOUNTER (EMERGENCY)
Facility: HOSPITAL | Age: 29
Discharge: HOME | End: 2024-08-30
Attending: EMERGENCY MEDICINE
Payer: MEDICAID

## 2024-08-30 ENCOUNTER — APPOINTMENT (OUTPATIENT)
Dept: GENERAL RADIOLOGY | Facility: HOSPITAL | Age: 29
End: 2024-08-30
Payer: MEDICAID

## 2024-08-30 VITALS
DIASTOLIC BLOOD PRESSURE: 76 MMHG | TEMPERATURE: 98.4 F | HEIGHT: 68 IN | SYSTOLIC BLOOD PRESSURE: 131 MMHG | RESPIRATION RATE: 14 BRPM | HEART RATE: 93 BPM | BODY MASS INDEX: 21.85 KG/M2 | WEIGHT: 144.18 LBS | OXYGEN SATURATION: 100 %

## 2024-08-30 DIAGNOSIS — T67.9XXA HEAT EXPOSURE, INITIAL ENCOUNTER: ICD-10-CM

## 2024-08-30 DIAGNOSIS — R11.2 NAUSEA AND VOMITING, UNSPECIFIED VOMITING TYPE: Primary | ICD-10-CM

## 2024-08-30 LAB
ALBUMIN SERPL-MCNC: 4.6 G/DL (ref 3.5–5.2)
ALBUMIN/GLOB SERPL: 1.5 G/DL
ALP SERPL-CCNC: 84 U/L (ref 39–117)
ALT SERPL W P-5'-P-CCNC: 20 U/L (ref 1–41)
ANION GAP SERPL CALCULATED.3IONS-SCNC: 9.9 MMOL/L (ref 5–15)
AST SERPL-CCNC: 25 U/L (ref 1–40)
BASOPHILS # BLD AUTO: 0.02 10*3/MM3 (ref 0–0.2)
BASOPHILS NFR BLD AUTO: 0.2 % (ref 0–1.5)
BILIRUB SERPL-MCNC: 0.6 MG/DL (ref 0–1.2)
BILIRUB UR QL STRIP: NEGATIVE
BUN SERPL-MCNC: 18 MG/DL (ref 6–20)
BUN/CREAT SERPL: 19.6 (ref 7–25)
CALCIUM SPEC-SCNC: 9.7 MG/DL (ref 8.6–10.5)
CHLORIDE SERPL-SCNC: 102 MMOL/L (ref 98–107)
CK MB SERPL-CCNC: 1.53 NG/ML
CK SERPL-CCNC: 177 U/L (ref 20–200)
CLARITY UR: CLEAR
CO2 SERPL-SCNC: 27.1 MMOL/L (ref 22–29)
COLOR UR: YELLOW
CREAT SERPL-MCNC: 0.92 MG/DL (ref 0.76–1.27)
DEPRECATED RDW RBC AUTO: 38.7 FL (ref 37–54)
EGFRCR SERPLBLD CKD-EPI 2021: 114.8 ML/MIN/1.73
EOSINOPHIL # BLD AUTO: 0.02 10*3/MM3 (ref 0–0.4)
EOSINOPHIL NFR BLD AUTO: 0.2 % (ref 0.3–6.2)
ERYTHROCYTE [DISTWIDTH] IN BLOOD BY AUTOMATED COUNT: 12.1 % (ref 12.3–15.4)
FLUAV SUBTYP SPEC NAA+PROBE: NOT DETECTED
FLUBV RNA ISLT QL NAA+PROBE: NOT DETECTED
GLOBULIN UR ELPH-MCNC: 3 GM/DL
GLUCOSE SERPL-MCNC: 85 MG/DL (ref 65–99)
GLUCOSE UR STRIP-MCNC: ABNORMAL MG/DL
HCT VFR BLD AUTO: 44.9 % (ref 37.5–51)
HGB BLD-MCNC: 16 G/DL (ref 13–17.7)
HGB UR QL STRIP.AUTO: NEGATIVE
HOLD SPECIMEN: NORMAL
HOLD SPECIMEN: NORMAL
IMM GRANULOCYTES # BLD AUTO: 0.02 10*3/MM3 (ref 0–0.05)
IMM GRANULOCYTES NFR BLD AUTO: 0.2 % (ref 0–0.5)
KETONES UR QL STRIP: ABNORMAL
LEUKOCYTE ESTERASE UR QL STRIP.AUTO: NEGATIVE
LIPASE SERPL-CCNC: 26 U/L (ref 13–60)
LYMPHOCYTES # BLD AUTO: 1.41 10*3/MM3 (ref 0.7–3.1)
LYMPHOCYTES NFR BLD AUTO: 16.1 % (ref 19.6–45.3)
MAGNESIUM SERPL-MCNC: 2 MG/DL (ref 1.6–2.6)
MCH RBC QN AUTO: 30.9 PG (ref 26.6–33)
MCHC RBC AUTO-ENTMCNC: 35.6 G/DL (ref 31.5–35.7)
MCV RBC AUTO: 86.7 FL (ref 79–97)
MONOCYTES # BLD AUTO: 0.57 10*3/MM3 (ref 0.1–0.9)
MONOCYTES NFR BLD AUTO: 6.5 % (ref 5–12)
NEUTROPHILS NFR BLD AUTO: 6.71 10*3/MM3 (ref 1.7–7)
NEUTROPHILS NFR BLD AUTO: 76.8 % (ref 42.7–76)
NITRITE UR QL STRIP: NEGATIVE
NRBC BLD AUTO-RTO: 0 /100 WBC (ref 0–0.2)
PH UR STRIP.AUTO: 6 [PH] (ref 5–8)
PLATELET # BLD AUTO: 204 10*3/MM3 (ref 140–450)
PMV BLD AUTO: 10.2 FL (ref 6–12)
POTASSIUM SERPL-SCNC: 4.1 MMOL/L (ref 3.5–5.2)
PROT SERPL-MCNC: 7.6 G/DL (ref 6–8.5)
PROT UR QL STRIP: NEGATIVE
QT INTERVAL: 344 MS
QTC INTERVAL: 397 MS
RBC # BLD AUTO: 5.18 10*6/MM3 (ref 4.14–5.8)
RSV RNA NPH QL NAA+NON-PROBE: NOT DETECTED
SARS-COV-2 RNA RESP QL NAA+PROBE: NOT DETECTED
SODIUM SERPL-SCNC: 139 MMOL/L (ref 136–145)
SP GR UR STRIP: >=1.03 (ref 1–1.03)
TROPONIN T SERPL HS-MCNC: <6 NG/L
UROBILINOGEN UR QL STRIP: ABNORMAL
WBC NRBC COR # BLD AUTO: 8.75 10*3/MM3 (ref 3.4–10.8)
WHOLE BLOOD HOLD COAG: NORMAL
WHOLE BLOOD HOLD SPECIMEN: NORMAL

## 2024-08-30 PROCEDURE — 82550 ASSAY OF CK (CPK): CPT

## 2024-08-30 PROCEDURE — 83690 ASSAY OF LIPASE: CPT | Performed by: EMERGENCY MEDICINE

## 2024-08-30 PROCEDURE — 82553 CREATINE MB FRACTION: CPT

## 2024-08-30 PROCEDURE — 25810000003 SODIUM CHLORIDE 0.9 % SOLUTION

## 2024-08-30 PROCEDURE — 85025 COMPLETE CBC W/AUTO DIFF WBC: CPT

## 2024-08-30 PROCEDURE — 99284 EMERGENCY DEPT VISIT MOD MDM: CPT

## 2024-08-30 PROCEDURE — 81003 URINALYSIS AUTO W/O SCOPE: CPT | Performed by: EMERGENCY MEDICINE

## 2024-08-30 PROCEDURE — 36415 COLL VENOUS BLD VENIPUNCTURE: CPT

## 2024-08-30 PROCEDURE — 93005 ELECTROCARDIOGRAM TRACING: CPT

## 2024-08-30 PROCEDURE — 25010000002 ONDANSETRON PER 1 MG

## 2024-08-30 PROCEDURE — 84484 ASSAY OF TROPONIN QUANT: CPT

## 2024-08-30 PROCEDURE — 83735 ASSAY OF MAGNESIUM: CPT

## 2024-08-30 PROCEDURE — 71045 X-RAY EXAM CHEST 1 VIEW: CPT

## 2024-08-30 PROCEDURE — 96374 THER/PROPH/DIAG INJ IV PUSH: CPT

## 2024-08-30 PROCEDURE — 80053 COMPREHEN METABOLIC PANEL: CPT | Performed by: EMERGENCY MEDICINE

## 2024-08-30 PROCEDURE — 87637 SARSCOV2&INF A&B&RSV AMP PRB: CPT | Performed by: EMERGENCY MEDICINE

## 2024-08-30 RX ORDER — ONDANSETRON 2 MG/ML
4 INJECTION INTRAMUSCULAR; INTRAVENOUS ONCE
Status: COMPLETED | OUTPATIENT
Start: 2024-08-30 | End: 2024-08-30

## 2024-08-30 RX ORDER — ONDANSETRON 4 MG/1
4 TABLET, ORALLY DISINTEGRATING ORAL 4 TIMES DAILY PRN
Qty: 30 TABLET | Refills: 0 | Status: SHIPPED | OUTPATIENT
Start: 2024-08-30

## 2024-08-30 RX ORDER — SODIUM CHLORIDE 0.9 % (FLUSH) 0.9 %
10 SYRINGE (ML) INJECTION AS NEEDED
Status: DISCONTINUED | OUTPATIENT
Start: 2024-08-30 | End: 2024-08-30 | Stop reason: HOSPADM

## 2024-08-30 RX ADMIN — ONDANSETRON 4 MG: 2 INJECTION INTRAMUSCULAR; INTRAVENOUS at 18:55

## 2024-08-30 RX ADMIN — SODIUM CHLORIDE 1000 ML: 9 INJECTION, SOLUTION INTRAVENOUS at 18:55

## 2024-08-30 NOTE — ED PROVIDER NOTES
Time: 6:08 PM EDT  Date of encounter:  8/30/2024  Independent Historian/Clinical History and Information was obtained by:   Patient    History is limited by: N/A    Chief Complaint: Weakness      History of Present Illness:  Patient is a 29 y.o. year old male who presents to the emergency department for evaluation of weakness.  Patient states he is felt very weak for the past 4 days.  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  He states today he began having vomiting and has vomited approximately 12 times since this morning.  He states he is also having fatigue, intermittent chest pain, shortness of air, dizziness, lightheadedness.  Patient [place] any fever cough.  Patient denies any known sick contacts.  Patient denies dysuria or abdominal pain.  No other complaints.      Patient Care Team  Primary Care Provider: Kenya De Jesus APRN    Past Medical History:     No Known Allergies  Past Medical History:   Diagnosis Date    Asthma     Mononucleosis      Past Surgical History:   Procedure Laterality Date    DENTAL PROCEDURE       History reviewed. No pertinent family history.    Home Medications:  Prior to Admission medications    Medication Sig Start Date End Date Taking? Authorizing Provider   acetaminophen (TYLENOL) 500 MG tablet Take 2 tablets by mouth Every 6 (Six) Hours As Needed for Mild Pain or Moderate Pain. 1/9/24   Vivian Cruz PA-C   brompheniramine-pseudoephedrine-DM 30-2-10 MG/5ML syrup Take 10 mL by mouth 4 (Four) Times a Day As Needed for Cough or Allergies. 2/28/24   Sharda Sandoval MD        Social History:   Social History     Tobacco Use    Smoking status: Never     Passive exposure: Never    Smokeless tobacco: Never   Vaping Use    Vaping status: Never Used    Passive vaping exposure: Yes   Substance Use Topics    Alcohol use: Yes     Comment: OCC    Drug use: Never         Review of Systems:  Review of Systems   Constitutional:  Positive for fatigue. Negative for fever.  "  Respiratory:  Positive for shortness of breath. Negative for cough.    Cardiovascular:  Positive for chest pain.   Gastrointestinal:  Positive for nausea and vomiting. Negative for abdominal pain.   Genitourinary:  Negative for dysuria.   Musculoskeletal:  Positive for myalgias.   Neurological:  Positive for weakness.        Physical Exam:  /89 (BP Location: Left arm, Patient Position: Lying)   Pulse 86   Temp 98.3 °F (36.8 °C) (Oral)   Resp 14   Ht 172.7 cm (68\")   Wt 65.4 kg (144 lb 2.9 oz)   SpO2 99%   BMI 21.92 kg/m²     Physical Exam  Vitals and nursing note reviewed.   Constitutional:       General: He is not in acute distress.     Appearance: Normal appearance. He is normal weight. He is not ill-appearing, toxic-appearing or diaphoretic.   HENT:      Head: Normocephalic and atraumatic.      Right Ear: Tympanic membrane, ear canal and external ear normal.      Left Ear: Tympanic membrane, ear canal and external ear normal.      Nose: Nose normal.      Mouth/Throat:      Mouth: Mucous membranes are moist.      Pharynx: No oropharyngeal exudate or posterior oropharyngeal erythema.   Eyes:      Extraocular Movements: Extraocular movements intact.      Conjunctiva/sclera: Conjunctivae normal.      Pupils: Pupils are equal, round, and reactive to light.   Cardiovascular:      Rate and Rhythm: Normal rate and regular rhythm.      Heart sounds: Normal heart sounds.   Pulmonary:      Effort: Pulmonary effort is normal.      Breath sounds: Normal breath sounds.   Abdominal:      General: Abdomen is flat. Bowel sounds are normal. There is no distension.      Palpations: Abdomen is soft. There is no mass.      Tenderness: There is no abdominal tenderness. There is no guarding or rebound.      Hernia: No hernia is present.   Musculoskeletal:         General: Normal range of motion.      Cervical back: Normal range of motion and neck supple.   Skin:     General: Skin is warm and dry.   Neurological:      " General: No focal deficit present.      Mental Status: He is alert and oriented to person, place, and time.   Psychiatric:         Mood and Affect: Mood normal.         Behavior: Behavior normal.         Thought Content: Thought content normal.         Judgment: Judgment normal.                Procedures:  Procedures      Medical Decision Making:    Comorbidities that affect care:    Asthma    External Notes reviewed:    Previous Clinic Note: Patient last seen urgent care on 2-      The following orders were placed and all results were independently analyzed by me:  Orders Placed This Encounter   Procedures    COVID PRE-OP / PRE-PROCEDURE SCREENING ORDER (NO ISOLATION) - Swab, Nasopharynx    COVID-19, FLU A/B, RSV PCR 1 HR TAT - Swab, Nasopharynx    XR Chest 1 View    [place] Draw    Comprehensive Metabolic Panel    Lipase    Urinalysis With Microscopic If Indicated (No Culture) - Urine, Clean Catch    CBC Auto Differential    CK Total & CKMB    Magnesium    Single High Sensitivity Troponin T    NPO Diet NPO Type: Strict NPO    Undress & Gown    ECG 12 Lead Dyspnea    Insert Peripheral IV    CBC & Differential    Green Top (Gel)    Lavender Top    Gold Top - SST    Light Blue Top       Medications Given in the Emergency Department:  Medications   sodium chloride 0.9 % flush 10 mL (has no administration in time range)   sodium chloride 0.9 % bolus 1,000 mL (0 mL Intravenous Stopped 8/30/24 1957)   ondansetron (ZOFRAN) injection 4 mg (4 mg Intravenous Given 8/30/24 1855)        ED Course:         Labs:    Lab Results (last 24 hours)       Procedure Component Value Units Date/Time    COVID PRE-OP / PRE-PROCEDURE SCREENING ORDER (NO ISOLATION) - Swab, Nasopharynx [[id number]]  (Normal) Collected: 08/30/24 1737    Specimen: Swab from Nasopharynx Updated: 08/30/24 1825    Narrative:      The following orders were created for panel order COVID PRE-OP / PRE-PROCEDURE SCREENING ORDER (NO ISOLATION) - Swab,  Nasopharynx.  Procedure                               Abnormality         Status                     ---------                               -----------         ------                     COVID-19, FLU A/B, RSV P...[[id number]]  Normal              Final result                 Please view results for these tests on the individual orders.    COVID-19, FLU A/B, RSV PCR 1 HR TAT - Swab, Nasopharynx [[id number]]  (Normal) Collected: 08/30/24 1737    Specimen: Swab from Nasopharynx Updated: 08/30/24 1825     COVID19 Not Detected     Influenza A PCR Not Detected     Influenza B PCR Not Detected     RSV, PCR Not Detected    Narrative:      Fact sheet for providers: https://www.fda.gov/media/334339/download    Fact sheet for patients: https://www.fda.gov/media/447783/download    Test performed by PCR.    CBC & Differential [[id number]]  (Abnormal) Collected: 08/30/24 1742    Specimen: Blood from Arm, Right Updated: 08/30/24 1751    Narrative:      The following orders were created for panel order CBC & Differential.  Procedure                               Abnormality         Status                     ---------                               -----------         ------                     CBC Auto Differential[[id number]]        Abnormal            Final result                 Please view results for these tests on the individual orders.    Comprehensive Metabolic Panel [[id number]] Collected: 08/30/24 1742    Specimen: Blood from Arm, Right Updated: 08/30/24 1808     Glucose 85 mg/dL      BUN 18 mg/dL      Creatinine 0.92 mg/dL      Sodium 139 mmol/L      Potassium 4.1 mmol/L      Comment: Slight hemolysis detected by analyzer. Result may be falsely elevated.        Chloride 102 mmol/L      CO2 27.1 mmol/L      Calcium 9.7 mg/dL      Total Protein 7.6 g/dL      Albumin 4.6 g/dL      ALT (SGPT) 20 U/L      AST (SGOT) 25 U/L      Alkaline Phosphatase 84 U/L      Total Bilirubin 0.6 mg/dL      Globulin 3.0 gm/dL      A/G Ratio 1.5  g/dL      BUN/Creatinine Ratio 19.6     Anion Gap 9.9 mmol/L      eGFR 114.8 mL/min/1.73     Narrative:      GFR Normal >60  Chronic Kidney Disease <60  Kidney Failure <15      Lipase [[id number]]  (Normal) Collected: 08/30/24 1742    Specimen: Blood from Arm, Right Updated: 08/30/24 1808     Lipase 26 U/L     CBC Auto Differential [[id number]]  (Abnormal) Collected: 08/30/24 1742    Specimen: Blood from Arm, Right Updated: 08/30/24 1751     WBC 8.75 10*3/mm3      RBC 5.18 10*6/mm3      Hemoglobin 16.0 g/dL      Hematocrit 44.9 %      MCV 86.7 fL      MCH 30.9 pg      MCHC 35.6 g/dL      RDW 12.1 %      RDW-SD 38.7 fl      MPV 10.2 fL      Platelets 204 10*3/mm3      Neutrophil % 76.8 %      Lymphocyte % 16.1 %      Monocyte % 6.5 %      Eosinophil % 0.2 %      Basophil % 0.2 %      Immature Grans % 0.2 %      Neutrophils, Absolute 6.71 10*3/mm3      Lymphocytes, Absolute 1.41 10*3/mm3      Monocytes, Absolute 0.57 10*3/mm3      Eosinophils, Absolute 0.02 10*3/mm3      Basophils, Absolute 0.02 10*3/mm3      Immature Grans, Absolute 0.02 10*3/mm3      nRBC 0.0 /100 WBC     CK Total & CKMB [[id number]]  (Normal) Collected: 08/30/24 1742    Specimen: Blood from Arm, Right Updated: 08/30/24 1835     CKMB 1.53 ng/mL      Creatine Kinase 177 U/L     Narrative:      CKMB results may be falsely decreased if patient taking Biotin.    Magnesium [[id number]]  (Normal) Collected: 08/30/24 1742    Specimen: Blood from Arm, Right Updated: 08/30/24 1835     Magnesium 2.0 mg/dL     Single High Sensitivity Troponin T [[id number]]  (Normal) Collected: 08/30/24 1742    Specimen: Blood from Arm, Right Updated: 08/30/24 1956     HS Troponin T <6 ng/L     Narrative:      High Sensitive Troponin T Reference Range:  <14.0 ng/L- Negative Female for AMI  <22.0 ng/L- Negative Male for AMI  >=14 - Abnormal Female indicating possible myocardial injury.  >=22 - Abnormal Male indicating possible myocardial injury.   Clinicians would have to utilize  clinical acumen, EKG, Troponin, and serial changes to determine if it is an Acute Myocardial Infarction or myocardial injury due to an underlying chronic condition.         Urinalysis With Microscopic If Indicated (No Culture) - Urine, Clean Catch [[id number]]  (Abnormal) Collected: 08/30/24 1800    Specimen: Urine, Clean Catch Updated: 08/30/24 1810     Color, UA Yellow     Appearance, UA Clear     pH, UA 6.0     Specific Gravity, UA >=1.030     Glucose,  mg/dL (2+)     Ketones, UA Trace     Bilirubin, UA Negative     Blood, UA Negative     Protein, UA Negative     Leuk Esterase, UA Negative     Nitrite, UA Negative     Urobilinogen, UA 1.0 E.U./dL    Narrative:      Urine microscopic not indicated.             Imaging:    XR Chest 1 View    Result Date: 8/30/2024  XR CHEST 1 VW Date of Exam: 8/30/2024 6:09 PM EDT Indication: dyspnea Comparison: September 6, 2023 Findings: The heart not definitely enlarged. The lungs seem clear. There are no pleural effusions. The visualized osseous structures do not appear unusual.     Impression: 1.An acute pulmonary process is not apparent. Electronically Signed: Josué Brice MD  8/30/2024 6:49 PM EDT  Workstation ID: [id number]       Differential Diagnosis and Discussion:    Weakness: Based on the patient's history, signs, and symptoms, the diffential diagnosis includes but is not limited to meningitis, stroke, sepsis, subarachnoid hemorrhage, intracranial bleeding, encephalitis, acute uti, dehydration, MS, myasthenia gravis, Guillan Withams, migraine variant, neuromuscular disorders vertigo, electrolyte imbalance, and metabolic disorders.    All labs were reviewed and interpreted by me.  All X-rays impressions were independently interpreted by me.    MDM  Number of Diagnoses or Management Options  Diagnosis management comments: Patient presented to the emergency department today for weakness and vomiting.  CBC notes normal white blood cell count hemoglobin [place] are.  CMP  notes normal liver and kidney function.  Lipase unremarkable.  CK negative.  Magnesium unremarkable.  Troponin negative.  Urinalysis negative for acute UTI but does note trace ketones and 2+ glucose.  Rapid influenza, COVID, RSV testing are negative.  Chest x-ray had no acute findings.  Patient was given 1 L of fluids in the emergency department along with 4 of Zofran.  Will discharge patient home with medications for vomiting control and have him ensure that he is staying well-hydrated.  Return to the emergency department guidelines given.       Amount and/or Complexity of Data Reviewed  Clinical lab tests: ordered and reviewed  Tests in the radiology section of CPT®: reviewed and ordered  Tests in the medicine section of CPT®: reviewed    Risk of Complications, Morbidity, and/or Mortality  Presenting problems: moderate  Diagnostic procedures: low  Management options: low    Patient Progress  Patient progress: stable         Patient Care Considerations:    CT ABDOMEN AND PELVIS: I considered ordering a CT scan of the abdomen and pelvis however patient had no abdominal tenderness      Consultants/Shared Management Plan:    None    Social Determinants of Health:    Patient is independent, reliable, and has access to care.       Disposition and Care Coordination:    Discharged: The patient is suitable and stable for discharge with no need for consideration of admission.    I have explained the patient´s condition, diagnoses and treatment plan based on the information available to me at this time. I have answered questions and addressed any concerns. The patient has a good  understanding of the patient´s diagnosis, condition, and treatment plan as can be expected at this point. The vital signs have been stable. The patient´s condition is stable and appropriate for discharge from the emergency department.      The patient will pursue further outpatient evaluation with the primary care physician or other designated or  consulting physician as outlined in the discharge instructions. They are agreeable to this plan of care and follow-up instructions have been explained in detail. The patient has received these instructions in written format and has expressed an understanding of the discharge instructions. The patient is aware that any significant change in condition or worsening of symptoms should prompt an immediate return to this or the closest emergency department or call to 911.  I have explained discharge medications and the need for follow up with the patient/caretakers. This was also printed in the discharge instructions. Patient was discharged with the following medications and follow up:      Medication List        New Prescriptions      ondansetron ODT 4 MG disintegrating tablet  Commonly known as: ZOFRAN-ODT  Place 1 tablet on the tongue 4 (Four) Times a Day As Needed for Nausea or Vomiting.               Where to Get Your Medications        These medications were sent to [id number] - [place], [place] - [address] AT [facility] - [phone]  - [phone]   [address] [place] [address]      Phone: [phone]   ondansetron ODT 4 MG disintegrating tablet      Kenya De Jesus, APRN  [address] [address] [phone]             Final diagnoses:   Nausea and vomiting, unspecified vomiting type   Heat exposure, initial encounter        ED Disposition       ED Disposition   Discharge    Condition   Stable    Comment   --               This medical record created using voice recognition software.             Marino Jha PA-C  08/30/24 2020

## 2024-08-30 NOTE — ED TRIAGE NOTES
Pt to ed from home with c/o vomiting all day with abd pain. Weakness and fatigue. All other s/s 3-4 days.

## 2024-08-31 NOTE — DISCHARGE INSTRUCTIONS
Your labs completed in the emergency department today look good.  Ensure that you are drinking plenty of fluids to stay well-hydrated.  Take Zofran as needed for nausea and vomiting.  Return to the emergency department for new or worsening symptoms.

## 2024-09-02 ENCOUNTER — HOSPITAL ENCOUNTER (EMERGENCY)
Facility: HOSPITAL | Age: 29
Discharge: HOME | End: 2024-09-02
Attending: EMERGENCY MEDICINE
Payer: MEDICAID

## 2024-09-02 ENCOUNTER — APPOINTMENT (OUTPATIENT)
Dept: GENERAL RADIOLOGY | Facility: HOSPITAL | Age: 29
End: 2024-09-02
Payer: MEDICAID

## 2024-09-02 VITALS
WEIGHT: 143.08 LBS | SYSTOLIC BLOOD PRESSURE: 129 MMHG | HEART RATE: 78 BPM | RESPIRATION RATE: 18 BRPM | DIASTOLIC BLOOD PRESSURE: 70 MMHG | TEMPERATURE: 97.5 F | BODY MASS INDEX: 21.76 KG/M2 | OXYGEN SATURATION: 98 %

## 2024-09-02 DIAGNOSIS — S63.501A SPRAIN OF RIGHT WRIST, INITIAL ENCOUNTER: Primary | ICD-10-CM

## 2024-09-02 PROCEDURE — 99283 EMERGENCY DEPT VISIT LOW MDM: CPT

## 2024-09-02 PROCEDURE — 73110 X-RAY EXAM OF WRIST: CPT

## 2024-09-02 NOTE — Clinical Note
[facility] EMERGENCY ROOM  [address] [place] [address]  Phone: [phone]  Fax: [phone]    James Leo was seen and treated in our emergency department on 9/2/2024.  He may return to work on 09/04/2024.         Thank you for choosing [facility].    Ming Jaramillo,

## 2024-09-02 NOTE — ED PROVIDER NOTES
Time: 3:52 PM EDT  Date of encounter:  9/2/2024  Independent Historian/Clinical History and Information was obtained by:   Patient    History is limited by: N/A    Chief Complaint: wrist pain       History of Present Illness:  Patient is a 29 y.o. year old male who presents to the emergency department for evaluation of right wrist pain that began just prior to arrival when he hit it up against a 2 x 4 at work.  Patient denies numbness/tingling in fingers.      Patient Care Team  Primary Care Provider: Provider, No Known    Past Medical History:     No Known Allergies  Past Medical History:   Diagnosis Date    Asthma     Mononucleosis      Past Surgical History:   Procedure Laterality Date    DENTAL PROCEDURE       No family history on file.    Home Medications:  Prior to Admission medications    Medication Sig Start Date End Date Taking? Authorizing Provider   acetaminophen (TYLENOL) 500 MG tablet Take 2 tablets by mouth Every 6 (Six) Hours As Needed for Mild Pain or Moderate Pain. 1/9/24   Vivian Cruz PA-C   brompheniramine-pseudoephedrine-DM 30-2-10 MG/5ML syrup Take 10 mL by mouth 4 (Four) Times a Day As Needed for Cough or Allergies. 2/28/24   Sharda Sandoval MD   ondansetron ODT (ZOFRAN-ODT) 4 MG disintegrating tablet Place 1 tablet on the tongue 4 (Four) Times a Day As Needed for Nausea or Vomiting. 8/30/24   Marino Jha PA-C        Social History:   Social History     Tobacco Use    Smoking status: Never     Passive exposure: Never    Smokeless tobacco: Never   Vaping Use    Vaping status: Never Used    Passive vaping exposure: Yes   Substance Use Topics    Alcohol use: Yes     Comment: OCC    Drug use: Never         Review of Systems:  Review of Systems   Constitutional:  Negative for chills and fever.   HENT:  Negative for congestion, rhinorrhea and sore throat.    Eyes:  Negative for pain and visual disturbance.   Respiratory:  Negative for apnea, cough, chest tightness and shortness of  breath.    Cardiovascular:  Negative for chest pain and palpitations.   Gastrointestinal:  Negative for abdominal pain, diarrhea, nausea and vomiting.   Genitourinary:  Negative for difficulty urinating and dysuria.   Musculoskeletal:  Positive for arthralgias. Negative for joint swelling and myalgias.   Skin:  Positive for color change.   Neurological:  Negative for seizures and headaches.   Psychiatric/Behavioral: Negative.     All other systems reviewed and are negative.       Physical Exam:  /70 (BP Location: Right arm, Patient Position: Sitting)   Pulse 78   Temp 97.5 °F (36.4 °C) (Oral)   Resp 18   Wt 64.9 kg (143 lb 1.3 oz)   SpO2 98%   BMI 21.76 kg/m²     Physical Exam  Vitals and nursing note reviewed.   Constitutional:       General: He is not in acute distress.     Appearance: Normal appearance. He is not toxic-appearing.   HENT:      Head: Normocephalic and atraumatic.      Jaw: There is normal jaw occlusion.   Eyes:      General: Lids are normal.      Extraocular Movements: Extraocular movements intact.      Conjunctiva/sclera: Conjunctivae normal.      Pupils: Pupils are equal, round, and reactive to light.   Cardiovascular:      Rate and Rhythm: Normal rate and regular rhythm.      Pulses: Normal pulses.      Heart sounds: Normal heart sounds.   Pulmonary:      Effort: Pulmonary effort is normal. No respiratory distress.      Breath sounds: Normal breath sounds. No wheezing or rhonchi.   Abdominal:      General: Abdomen is flat.      Palpations: Abdomen is soft.      Tenderness: There is no abdominal tenderness. There is no guarding or rebound.   Musculoskeletal:         General: Tenderness (Mild appreciated upon palpation to dorsal aspect right wrist) present. Normal range of motion.      Cervical back: Normal range of motion and neck supple.      Right lower leg: No edema.      Left lower leg: No edema.   Skin:     General: Skin is warm and dry.      Findings: Bruising (Right wrist)  present.   Neurological:      Mental Status: He is alert and oriented to person, place, and time. Mental status is at baseline.   Psychiatric:         Mood and Affect: Mood normal.                  Procedures:  Procedures      Medical Decision Making:      Comorbidities that affect care:    Asthma    External Notes reviewed:          The following orders were placed and all results were independently analyzed by me:  Orders Placed This Encounter   Procedures    XR Wrist 3+ View Right       Medications Given in the Emergency Department:  Medications - No data to display     ED Course:         Labs:    Lab Results (last 24 hours)       ** No results found for the last 24 hours. **             Imaging:    XR Wrist 3+ View Right    Result Date: 9/2/2024  XR WRIST 3+ VW RIGHT Date of Exam: 9/2/2024 3:02 PM EDT Indication: injury Comparison: None available. Findings: The distal radius and ulna are intact. The carpal and visualized metacarpal bones are intact. Bony alignment is normal. No lytic or blastic disease.     No acute fracture or dislocation. Electronically Signed: Fabian Villaseñor MD  9/2/2024 3:21 PM EDT  Workstation ID: [id number]       Differential Diagnosis and Discussion:    Extremity Pain: Differential diagnosis includes but is not limited to soft tissue sprain, tendonitis, tendon injury, dislocation, fracture, deep vein thrombosis, arterial insufficiency, osteoarthritis, bursitis, and ligamentous damage.    All X-rays impressions were independently interpreted by me.    MDM       X-ray right wrist shows no acute fracture or dislocation.  Patient is afebrile.  Patient's oxygen saturation is 98% on room air.  Patient is resting comfortably this time.  Instructed patient to return to ED if he develops any new or worsening symptoms.  Otherwise follow-up with PCP.  Patient states he understands and agrees with plan of care.              Patient Care Considerations:          Consultants/Shared Management  Plan:    None    Social Determinants of Health:    Patient is independent, reliable, and has access to care.       Disposition and Care Coordination:    Discharged: The patient is suitable and stable for discharge with no need for consideration of admission.    I have explained the patient´s condition, diagnoses and treatment plan based on the information available to me at this time. I have answered questions and addressed any concerns. The patient has a good  understanding of the patient´s diagnosis, condition, and treatment plan as can be expected at this point. The vital signs have been stable. The patient´s condition is stable and appropriate for discharge from the emergency department.      The patient will pursue further outpatient evaluation with the primary care physician or other designated or consulting physician as outlined in the discharge instructions. They are agreeable to this plan of care and follow-up instructions have been explained in detail. The patient has received these instructions in written format and has expressed an understanding of the discharge instructions. The patient is aware that any significant change in condition or worsening of symptoms should prompt an immediate return to this or the closest emergency department or call to 911.  I have explained discharge medications and the need for follow up with the patient/caretakers. This was also printed in the discharge instructions. Patient was discharged with the following medications and follow up:      Medication List      No changes were made to your prescriptions during this visit.      Provider, No Known  [facility]  [address] [place] [id number]    Call in 1 day  To schedule follow-up       Final diagnoses:   Sprain of right wrist, initial encounter        ED Disposition       ED Disposition   Discharge    Condition   Stable    Comment   --               This medical record created using voice recognition software.              Ramirez Parnell PA-C  09/02/24 155

## 2024-09-07 LAB
QT INTERVAL: 344 MS
QTC INTERVAL: 397 MS

## 2024-10-04 LAB
BASOPHILS # BLD AUTO: 0.02 10*3/MM3 (ref 0–0.2)
BASOPHILS NFR BLD AUTO: 0.3 % (ref 0–1.5)
DEPRECATED RDW RBC AUTO: 44.1 FL (ref 37–54)
EOSINOPHIL # BLD AUTO: 0.1 10*3/MM3 (ref 0–0.4)
EOSINOPHIL NFR BLD AUTO: 1.7 % (ref 0.3–6.2)
ERYTHROCYTE [DISTWIDTH] IN BLOOD BY AUTOMATED COUNT: 13.5 % (ref 12.3–15.4)
HCT VFR BLD AUTO: 46.6 % (ref 37.5–51)
HGB BLD-MCNC: 16.2 G/DL (ref 13–17.7)
HOLD SPECIMEN: NORMAL
HOLD SPECIMEN: NORMAL
IMM GRANULOCYTES # BLD AUTO: 0.01 10*3/MM3 (ref 0–0.05)
IMM GRANULOCYTES NFR BLD AUTO: 0.2 % (ref 0–0.5)
LYMPHOCYTES # BLD AUTO: 1.81 10*3/MM3 (ref 0.7–3.1)
LYMPHOCYTES NFR BLD AUTO: 31.5 % (ref 19.6–45.3)
MCH RBC QN AUTO: 31.3 PG (ref 26.6–33)
MCHC RBC AUTO-ENTMCNC: 34.8 G/DL (ref 31.5–35.7)
MCV RBC AUTO: 90 FL (ref 79–97)
MONOCYTES # BLD AUTO: 0.58 10*3/MM3 (ref 0.1–0.9)
MONOCYTES NFR BLD AUTO: 10.1 % (ref 5–12)
NEUTROPHILS NFR BLD AUTO: 3.22 10*3/MM3 (ref 1.7–7)
NEUTROPHILS NFR BLD AUTO: 56.2 % (ref 42.7–76)
NRBC BLD AUTO-RTO: 0 /100 WBC (ref 0–0.2)
PLATELET # BLD AUTO: 184 10*3/MM3 (ref 140–450)
PMV BLD AUTO: 10.3 FL (ref 6–12)
RBC # BLD AUTO: 5.18 10*6/MM3 (ref 4.14–5.8)
WBC NRBC COR # BLD AUTO: 5.74 10*3/MM3 (ref 3.4–10.8)
WHOLE BLOOD HOLD COAG: NORMAL
WHOLE BLOOD HOLD SPECIMEN: NORMAL

## 2024-10-04 PROCEDURE — 83690 ASSAY OF LIPASE: CPT | Performed by: EMERGENCY MEDICINE

## 2024-10-04 PROCEDURE — 36415 COLL VENOUS BLD VENIPUNCTURE: CPT | Performed by: EMERGENCY MEDICINE

## 2024-10-04 PROCEDURE — 85025 COMPLETE CBC W/AUTO DIFF WBC: CPT | Performed by: EMERGENCY MEDICINE

## 2024-10-04 PROCEDURE — 80053 COMPREHEN METABOLIC PANEL: CPT | Performed by: EMERGENCY MEDICINE

## 2024-10-04 PROCEDURE — 99285 EMERGENCY DEPT VISIT HI MDM: CPT

## 2024-10-04 PROCEDURE — 82077 ASSAY SPEC XCP UR&BREATH IA: CPT | Performed by: EMERGENCY MEDICINE

## 2024-10-04 RX ORDER — SODIUM CHLORIDE 0.9 % (FLUSH) 0.9 %
10 SYRINGE (ML) INJECTION AS NEEDED
Status: DISCONTINUED | OUTPATIENT
Start: 2024-10-04 | End: 2024-10-05 | Stop reason: HOSPADM

## 2024-10-05 ENCOUNTER — HOSPITAL ENCOUNTER (EMERGENCY)
Facility: HOSPITAL | Age: 29
Discharge: HOME | End: 2024-10-05
Attending: EMERGENCY MEDICINE
Payer: MEDICAID

## 2024-10-05 ENCOUNTER — APPOINTMENT (OUTPATIENT)
Dept: CT IMAGING | Facility: HOSPITAL | Age: 29
End: 2024-10-05
Payer: MEDICAID

## 2024-10-05 VITALS
DIASTOLIC BLOOD PRESSURE: 75 MMHG | OXYGEN SATURATION: 99 % | RESPIRATION RATE: 18 BRPM | TEMPERATURE: 98 F | BODY MASS INDEX: 23.29 KG/M2 | SYSTOLIC BLOOD PRESSURE: 136 MMHG | WEIGHT: 153.66 LBS | HEART RATE: 75 BPM | HEIGHT: 68 IN

## 2024-10-05 DIAGNOSIS — R10.9 ABDOMINAL PAIN, UNSPECIFIED ABDOMINAL LOCATION: ICD-10-CM

## 2024-10-05 DIAGNOSIS — K29.20 ACUTE ALCOHOLIC GASTRITIS WITHOUT HEMORRHAGE: Primary | ICD-10-CM

## 2024-10-05 LAB
ALBUMIN SERPL-MCNC: 4.4 G/DL (ref 3.5–5.2)
ALBUMIN/GLOB SERPL: 1.7 G/DL
ALP SERPL-CCNC: 87 U/L (ref 39–117)
ALT SERPL W P-5'-P-CCNC: 25 U/L (ref 1–41)
ANION GAP SERPL CALCULATED.3IONS-SCNC: 11.5 MMOL/L (ref 5–15)
AST SERPL-CCNC: 29 U/L (ref 1–40)
BILIRUB SERPL-MCNC: 0.6 MG/DL (ref 0–1.2)
BILIRUB UR QL STRIP: NEGATIVE
BUN SERPL-MCNC: 6 MG/DL (ref 6–20)
BUN/CREAT SERPL: 8.6 (ref 7–25)
CALCIUM SPEC-SCNC: 9.2 MG/DL (ref 8.6–10.5)
CHLORIDE SERPL-SCNC: 106 MMOL/L (ref 98–107)
CLARITY UR: CLEAR
CO2 SERPL-SCNC: 25.5 MMOL/L (ref 22–29)
COLOR UR: YELLOW
CREAT SERPL-MCNC: 0.7 MG/DL (ref 0.76–1.27)
EGFRCR SERPLBLD CKD-EPI 2021: 127.1 ML/MIN/1.73
ETHANOL BLD-MCNC: 152 MG/DL (ref 0–10)
ETHANOL UR QL: 0.15 %
GLOBULIN UR ELPH-MCNC: 2.6 GM/DL
GLUCOSE SERPL-MCNC: 92 MG/DL (ref 65–99)
GLUCOSE UR STRIP-MCNC: NEGATIVE MG/DL
HGB UR QL STRIP.AUTO: NEGATIVE
KETONES UR QL STRIP: NEGATIVE
LEUKOCYTE ESTERASE UR QL STRIP.AUTO: NEGATIVE
LIPASE SERPL-CCNC: 31 U/L (ref 13–60)
NITRITE UR QL STRIP: NEGATIVE
PH UR STRIP.AUTO: 7 [PH] (ref 5–8)
POTASSIUM SERPL-SCNC: 4 MMOL/L (ref 3.5–5.2)
PROT SERPL-MCNC: 7 G/DL (ref 6–8.5)
PROT UR QL STRIP: NEGATIVE
SODIUM SERPL-SCNC: 143 MMOL/L (ref 136–145)
SP GR UR STRIP: 1.01 (ref 1–1.03)
UROBILINOGEN UR QL STRIP: NORMAL

## 2024-10-05 PROCEDURE — 25510000001 IOPAMIDOL PER 1 ML: Performed by: EMERGENCY MEDICINE

## 2024-10-05 PROCEDURE — 74177 CT ABD & PELVIS W/CONTRAST: CPT

## 2024-10-05 PROCEDURE — 81003 URINALYSIS AUTO W/O SCOPE: CPT | Performed by: EMERGENCY MEDICINE

## 2024-10-05 RX ORDER — FAMOTIDINE 20 MG/1
20 TABLET, FILM COATED ORAL ONCE
Status: COMPLETED | OUTPATIENT
Start: 2024-10-05 | End: 2024-10-05

## 2024-10-05 RX ORDER — FAMOTIDINE 20 MG/1
20 TABLET, FILM COATED ORAL NIGHTLY
Qty: 30 TABLET | Refills: 0 | Status: SHIPPED | OUTPATIENT
Start: 2024-10-05

## 2024-10-05 RX ORDER — IOPAMIDOL 755 MG/ML
100 INJECTION, SOLUTION INTRAVASCULAR
Status: COMPLETED | OUTPATIENT
Start: 2024-10-05 | End: 2024-10-05

## 2024-10-05 RX ADMIN — FAMOTIDINE 20 MG: 20 TABLET ORAL at 02:24

## 2024-10-05 RX ADMIN — IOPAMIDOL 80 ML: 755 INJECTION, SOLUTION INTRAVENOUS at 00:47

## 2024-10-05 NOTE — ED PROVIDER NOTES
Time: 11:24 PM EDT  Date of encounter:  10/4/2024  Independent Historian/Clinical History and Information was obtained by:   Patient    History is limited by: N/A    Chief Complaint   Patient presents with    Abdominal Pain         History of Present Illness:  Patient is a 29 y.o. year old male who presents to the emergency department for evaluation of left-sided abdominal pain that has been present for several months but worse over the last 4 days.  Patient denies nausea/vomiting.  Patient states his last bowel movement was 4 hours ago and he states it was normal per patient.  Patient denies fever.  Patient states he was drinking alcohol with his friend tonight prior to coming in.  Patient denies urinary symptoms.  Patient denies prior abdominal surgical history. Patient was seen by provider in triage, Ramirez Parnell PA-C      Patient Care Team  Primary Care Provider: Kenya De Jesus APRN    Past Medical History:     No Known Allergies  Past Medical History:   Diagnosis Date    Asthma     Mononucleosis      Past Surgical History:   Procedure Laterality Date    DENTAL PROCEDURE       No family history on file.    Home Medications:  Prior to Admission medications    Medication Sig Start Date End Date Taking? Authorizing Provider   acetaminophen (TYLENOL) 500 MG tablet Take 2 tablets by mouth Every 6 (Six) Hours As Needed for Mild Pain or Moderate Pain. 1/9/24   Vivian Cruz PA-C   brompheniramine-pseudoephedrine-DM 30-2-10 MG/5ML syrup Take 10 mL by mouth 4 (Four) Times a Day As Needed for Cough or Allergies. 2/28/24   Sharda Sandoval MD   ondansetron ODT (ZOFRAN-ODT) 4 MG disintegrating tablet Place 1 tablet on the tongue 4 (Four) Times a Day As Needed for Nausea or Vomiting. 8/30/24   Marino Jha PA-C        Social History:   Social History     Tobacco Use    Smoking status: Never     Passive exposure: Never    Smokeless tobacco: Never   Vaping Use    Vaping status: Never Used    Passive vaping  "exposure: Yes   Substance Use Topics    Alcohol use: Yes     Comment: OCC    Drug use: Never         Review of Systems:  Review of Systems   Constitutional:  Negative for chills and fever.   HENT:  Negative for congestion, ear pain and sore throat.    Eyes:  Negative for pain.   Respiratory:  Negative for cough, chest tightness and shortness of breath.    Cardiovascular:  Negative for chest pain.   Gastrointestinal:  Positive for abdominal pain. Negative for diarrhea, nausea and vomiting.   Genitourinary:  Negative for flank pain and hematuria.   Musculoskeletal:  Negative for joint swelling.   Skin:  Negative for pallor.   Neurological:  Negative for seizures and headaches.   All other systems reviewed and are negative.       Physical Exam:  /75   Pulse 75   Temp 98 °F (36.7 °C) (Oral)   Resp 18   Ht 172.7 cm (68\")   Wt 69.7 kg (153 lb 10.6 oz)   SpO2 99%   BMI 23.36 kg/m²         Physical Exam  Vitals and nursing note reviewed.   Constitutional:       General: He is not in acute distress.     Appearance: Normal appearance. He is not toxic-appearing.   HENT:      Head: Normocephalic and atraumatic.      Jaw: There is normal jaw occlusion.   Eyes:      General: Lids are normal.      Extraocular Movements: Extraocular movements intact.      Conjunctiva/sclera: Conjunctivae normal.      Pupils: Pupils are equal, round, and reactive to light.   Cardiovascular:      Rate and Rhythm: Normal rate and regular rhythm.      Pulses: Normal pulses.      Heart sounds: Normal heart sounds.   Pulmonary:      Effort: Pulmonary effort is normal. No respiratory distress.      Breath sounds: Normal breath sounds. No wheezing or rhonchi.   Abdominal:      General: Abdomen is flat. There is no distension.      Palpations: Abdomen is soft.      Tenderness: There is abdominal tenderness in the left lower quadrant. There is no guarding or rebound.   Musculoskeletal:         General: Normal range of motion.      Cervical back: " Normal range of motion and neck supple.      Right lower leg: No edema.      Left lower leg: No edema.   Skin:     General: Skin is warm and dry.      Coloration: Skin is not cyanotic.   Neurological:      Mental Status: He is alert and oriented to person, place, and time. Mental status is at baseline.   Psychiatric:         Attention and Perception: Attention and perception normal.         Mood and Affect: Mood normal.           Procedures:  Procedures      Medical Decision Making:      Comorbidities that affect care:    Asthma, alcohol use    External Notes reviewed:    Previous Clinic Note: Outpatient PCP visit for carpal tunnel August 2023      The following orders were placed and all results were independently analyzed by me:  Orders Placed This Encounter   Procedures    CT Abdomen Pelvis With Contrast    [place] Draw    Comprehensive Metabolic Panel    Lipase    Urinalysis With Microscopic If Indicated (No Culture) - Urine, Clean Catch    Ethanol    CBC Auto Differential    Ambulatory Referral to Gastroenterology    CBC & Differential    Green Top (Gel)    Lavender Top    Gold Top - SST    Light Blue Top       Medications Given in the Emergency Department:  Medications   iopamidol (ISOVUE-370) 76 % injection 100 mL (80 mL Intravenous Given 10/5/24 0047)   famotidine (PEPCID) tablet 20 mg (20 mg Oral Given 10/5/24 0224)        ED Course:    The patient was initially evaluated in the triage area where orders were placed. The patient was later dispositioned by Noel Zayas MD.      The patient was advised to stay for completion of workup which includes but is not limited to communication of labs and radiological results, reassessment and plan. The patient was advised that leaving prior to disposition by a provider could result in critical findings that are not communicated to the patient.     ED Course as of 10/05/24 0708   Fri Oct 04, 2024   [id number] Provider In Triage: Patient was evaluated by me in triageRamirez  JEANNINE Parnell. Orders were placed and the patient is currently awaiting final results and disposition.   [SK]      ED Course User Index  [SK] Ramirez Parnell PA-C       Labs:    Lab Results (last 24 hours)       Procedure Component Value Units Date/Time    CBC & Differential [[id number]]  (Normal) Collected: 10/04/24 2330    Specimen: Blood Updated: 10/04/24 2346    Narrative:      The following orders were created for panel order CBC & Differential.  Procedure                               Abnormality         Status                     ---------                               -----------         ------                     CBC Auto Differential[[id number]]        Normal              Final result                 Please view results for these tests on the individual orders.    Comprehensive Metabolic Panel [[id number]]  (Abnormal) Collected: 10/04/24 2330    Specimen: Blood Updated: 10/05/24 0004     Glucose 92 mg/dL      BUN 6 mg/dL      Creatinine 0.70 mg/dL      Sodium 143 mmol/L      Potassium 4.0 mmol/L      Comment: Slight hemolysis detected by analyzer. Result may be falsely elevated.        Chloride 106 mmol/L      CO2 25.5 mmol/L      Calcium 9.2 mg/dL      Total Protein 7.0 g/dL      Albumin 4.4 g/dL      ALT (SGPT) 25 U/L      AST (SGOT) 29 U/L      Alkaline Phosphatase 87 U/L      Total Bilirubin 0.6 mg/dL      Globulin 2.6 gm/dL      A/G Ratio 1.7 g/dL      BUN/Creatinine Ratio 8.6     Anion Gap 11.5 mmol/L      eGFR 127.1 mL/min/1.73     Narrative:      GFR Normal >60  Chronic Kidney Disease <60  Kidney Failure <15      Lipase [[id number]]  (Normal) Collected: 10/04/24 2330    Specimen: Blood Updated: 10/05/24 0004     Lipase 31 U/L     Ethanol [[id number]]  (Abnormal) Collected: 10/04/24 2330    Specimen: Blood Updated: 10/05/24 0004     Ethanol 152 mg/dL      Ethanol % 0.152 %     Narrative:      Ethanol (Plasma)  <10 Essentially Negative    Toxic Concentrations           mg/dL    Flushing, slowing of  reflexes    Impaired visual activity         Depression of CNS              >100  Possible Coma                  >300       CBC Auto Differential [[id number]]  (Normal) Collected: 10/04/24 2330    Specimen: Blood Updated: 10/04/24 2346     WBC 5.74 10*3/mm3      RBC 5.18 10*6/mm3      Hemoglobin 16.2 g/dL      Hematocrit 46.6 %      MCV 90.0 fL      MCH 31.3 pg      MCHC 34.8 g/dL      RDW 13.5 %      RDW-SD 44.1 fl      MPV 10.3 fL      Platelets 184 10*3/mm3      Neutrophil % 56.2 %      Lymphocyte % 31.5 %      Monocyte % 10.1 %      Eosinophil % 1.7 %      Basophil % 0.3 %      Immature Grans % 0.2 %      Neutrophils, Absolute 3.22 10*3/mm3      Lymphocytes, Absolute 1.81 10*3/mm3      Monocytes, Absolute 0.58 10*3/mm3      Eosinophils, Absolute 0.10 10*3/mm3      Basophils, Absolute 0.02 10*3/mm3      Immature Grans, Absolute 0.01 10*3/mm3      nRBC 0.0 /100 WBC     Urinalysis With Microscopic If Indicated (No Culture) - Urine, Clean Catch [[id number]]  (Normal) Collected: 10/05/24 0038    Specimen: Urine, Clean Catch Updated: 10/05/24 0113     Color, UA Yellow     Appearance, UA Clear     pH, UA 7.0     Specific Gravity, UA 1.007     Glucose, UA Negative     Ketones, UA Negative     Bilirubin, UA Negative     Blood, UA Negative     Protein, UA Negative     Leuk Esterase, UA Negative     Nitrite, UA Negative     Urobilinogen, UA 1.0 E.U./dL    Narrative:      Urine microscopic not indicated.             Imaging:    CT Abdomen Pelvis With Contrast    Result Date: 10/5/2024  CT ABDOMEN PELVIS W CONTRAST-  Date of exam: 10/5/2024, 12:45 A.M.  Indication: LLQ abd. pain.  Comparison: None available. That is, none of the prior relevant comparison studies is able to be retrieved from the Imaging Archive during the time of this interpretation for correlation.  TECHNIQUE: Axial CT images were obtained of the abdomen and pelvis following the uneventful intravenous administration of 80 mL (or less) of Isovue-370  "contrast agent. Reconstructed 2D (two-dimensional) coronal and sagittal images were also obtained. Automated exposure control and iterative construction methods were used. Additionally, the radiation dose reduction device was turned on for each scan per the ALARA (As Low as Reasonably Achievable) protocol. No oral contrast agent was administered for the study. Please see the electronic medical record, EMR (i.e., Epic), for the documented dose of intravenous contrast agent as well as the radiation dose.  FINDINGS: There is a \"small bowel feces sign\" involving left-sided small bowel, especially within the mid-to-lower left abdomen. This finding is nonspecific. It may be an indication of bacterial overgrowth and slow transit. Malabsorption cannot be excluded. A partial obstruction is possible but is thought to be less likely. Otherwise, no acute findings are seen. No definite mechanical bowel obstruction. No pathologic bowel wall thickening is appreciated. No pneumoperitoneum or pneumatosis. No portal or mesenteric venous gas. No acute appendicitis, colitis, or diverticulitis. There are scattered colonic diverticula.  Additionally, no acute cholecystitis or pancreatitis. No gallstones. The gallbladder is contracted. No biliary ductal dilatation. No renal/ureteral stones or hydronephrosis is seen. No obstructive uropathy is identified. No acute pyelonephritis. No ascites. No aneurysmal dilatation of the aortoiliac arterial system. No acute intraperitoneal or retroperitoneal hemorrhage. No abnormalities of the urinary bladder. Probably no significant prostatic enlargement. No enlarged intra-abdominal or intrapelvic lymph nodes. No adrenal mass. No acute findings are seen with regard to the liver or spleen. No acute fracture. No aggressive osseous lesion. Incidentally, L4 may be a limbus vertebra. No acute infiltrate is seen in the partially imaged lung bases. There are incidental pelvic phleboliths. There may be " narrowing of the proximal celiac trunk, suggestive of median arcuate ligament syndrome (MALS). Please correlate clinically.       1.  Fecal-like intraluminal content involves small bowel, especially in the left lower quadrant. This finding is nonspecific. It may be an indication of bacterial overgrowth, slow bowel transit, and possible malabsorption. A partial small bowel obstruction cannot be excluded but is thought to be less likely. No associated mural thickening or inflammatory change in the adjacent mesentery. 2.  Otherwise, no acute findings are seen in the abdomen or pelvis by enhanced CT examination. 3.  Please see above comments for further detail.   Portions of this note were completed with a voice recognition program.   Electronically Signed By-Fabian Betancourt MD On:10/5/2024 1:05 AM         Differential Diagnosis and Discussion:      Abdominal Pain: Based on the patient's signs and symptoms, I considered abdominal aortic aneurysm, small bowel obstruction, pancreatitis, acute cholecystitis, acute appendecitis, peptic ulcer disease, gastritis, colitis, endocrine disorders, irritable bowel syndrome and other differential diagnosis an etiology of the patient's abdominal pain.    All labs were reviewed and interpreted by me.  CT scan radiology impression was interpreted by me.    [facility]                   Patient Care Considerations:    ANTIBIOTICS: I considered prescribing antibiotics as an outpatient however no bacterial focus of infection was found.      Consultants/Shared Management Plan:    None    Social Determinants of Health:    Patient has presented with family members who are responsible, reliable and will ensure follow up care.      Disposition and Care Coordination:    Discharged: The patient is suitable and stable for discharge with no need for consideration of admission.    I have explained the patient´s condition, diagnoses and treatment plan based on the information available to me at this time. I  have answered questions and addressed any concerns. The patient has a good  understanding of the patient´s diagnosis, condition, and treatment plan as can be expected at this point. The vital signs have been stable. The patient´s condition is stable and appropriate for discharge from the emergency department.      The patient will pursue further outpatient evaluation with the primary care physician or other designated or consulting physician as outlined in the discharge instructions. They are agreeable to this plan of care and follow-up instructions have been explained in detail. The patient has received these instructions in written format and has expressed an understanding of the discharge instructions. The patient is aware that any significant change in condition or worsening of symptoms should prompt an immediate return to this or the closest emergency department or call to 911.  I have explained discharge medications and the need for follow up with the patient/caretakers. This was also printed in the discharge instructions. Patient was discharged with the following medications and follow up:      Medication List        New Prescriptions      famotidine 20 MG tablet  Commonly known as: PEPCID  Take 1 tablet by mouth Every Night.               Where to Get Your Medications        These medications were sent to [id number] - [place], [place] - [address] AT [facility] - [phone]  - [phone] FX  [address] [place] [address]      Phone: [phone]   famotidine 20 MG tablet      Kenya De Jesus, APRN  [address] [address] [phone]    Schedule an appointment as soon as possible for a visit          Final diagnoses:   Acute alcoholic gastritis without hemorrhage   Abdominal pain, unspecified abdominal location        ED Disposition       ED Disposition   Discharge    Condition   Stable    Comment   --               This medical  record created using voice recognition software.             Noel Zayas MD  10/05/24 4940

## 2024-10-15 ENCOUNTER — TELEPHONE (OUTPATIENT)
Dept: GASTROENTEROLOGY | Facility: CLINIC | Age: 29
End: 2024-10-15

## 2024-10-15 NOTE — TELEPHONE ENCOUNTER
I attempted to contact James Leo 1995 regarding the appointment no show with LORI Herman on 10/15/2024@9:30. Patient is aware that there is a 24-hour cancellation policy and understands that a no-show letter will be mailed to them at the address on file.The patient didn't answer. Mailbox was full. Couldn't leave a message.

## 2025-01-14 ENCOUNTER — HOSPITAL ENCOUNTER (EMERGENCY)
Facility: HOSPITAL | Age: 30
Discharge: HOME | End: 2025-01-14
Attending: EMERGENCY MEDICINE | Admitting: EMERGENCY MEDICINE
Payer: MEDICAID

## 2025-01-14 VITALS
HEART RATE: 114 BPM | HEIGHT: 68 IN | DIASTOLIC BLOOD PRESSURE: 68 MMHG | BODY MASS INDEX: 23.69 KG/M2 | TEMPERATURE: 99 F | OXYGEN SATURATION: 96 % | SYSTOLIC BLOOD PRESSURE: 127 MMHG | RESPIRATION RATE: 18 BRPM | WEIGHT: 156.31 LBS

## 2025-01-14 DIAGNOSIS — R05.1 ACUTE COUGH: ICD-10-CM

## 2025-01-14 DIAGNOSIS — J10.1 INFLUENZA A: Primary | ICD-10-CM

## 2025-01-14 DIAGNOSIS — R11.2 NAUSEA AND VOMITING, UNSPECIFIED VOMITING TYPE: ICD-10-CM

## 2025-01-14 LAB
ALBUMIN SERPL-MCNC: 4.5 G/DL (ref 3.5–5.2)
ALBUMIN/GLOB SERPL: 1.6 G/DL
ALP SERPL-CCNC: 62 U/L (ref 39–117)
ALT SERPL W P-5'-P-CCNC: 22 U/L (ref 1–41)
ANION GAP SERPL CALCULATED.3IONS-SCNC: 10.9 MMOL/L (ref 5–15)
AST SERPL-CCNC: 28 U/L (ref 1–40)
BASOPHILS # BLD AUTO: 0.02 10*3/MM3 (ref 0–0.2)
BASOPHILS NFR BLD AUTO: 0.4 % (ref 0–1.5)
BILIRUB SERPL-MCNC: 0.7 MG/DL (ref 0–1.2)
BUN SERPL-MCNC: 17 MG/DL (ref 6–20)
BUN/CREAT SERPL: 15.6 (ref 7–25)
CALCIUM SPEC-SCNC: 8.8 MG/DL (ref 8.6–10.5)
CHLORIDE SERPL-SCNC: 99 MMOL/L (ref 98–107)
CO2 SERPL-SCNC: 27.1 MMOL/L (ref 22–29)
CREAT SERPL-MCNC: 1.09 MG/DL (ref 0.76–1.27)
DEPRECATED RDW RBC AUTO: 38.7 FL (ref 37–54)
EGFRCR SERPLBLD CKD-EPI 2021: 93.6 ML/MIN/1.73
EOSINOPHIL # BLD AUTO: 0.19 10*3/MM3 (ref 0–0.4)
EOSINOPHIL NFR BLD AUTO: 3.4 % (ref 0.3–6.2)
ERYTHROCYTE [DISTWIDTH] IN BLOOD BY AUTOMATED COUNT: 11.7 % (ref 12.3–15.4)
FLUAV SUBTYP SPEC NAA+PROBE: DETECTED
FLUBV RNA ISLT QL NAA+PROBE: NOT DETECTED
GLOBULIN UR ELPH-MCNC: 2.8 GM/DL
GLUCOSE SERPL-MCNC: 110 MG/DL (ref 65–99)
HCT VFR BLD AUTO: 50.8 % (ref 37.5–51)
HGB BLD-MCNC: 17.4 G/DL (ref 13–17.7)
HOLD SPECIMEN: NORMAL
HOLD SPECIMEN: NORMAL
IMM GRANULOCYTES # BLD AUTO: 0.03 10*3/MM3 (ref 0–0.05)
IMM GRANULOCYTES NFR BLD AUTO: 0.5 % (ref 0–0.5)
LIPASE SERPL-CCNC: 23 U/L (ref 13–60)
LYMPHOCYTES # BLD AUTO: 0.65 10*3/MM3 (ref 0.7–3.1)
LYMPHOCYTES NFR BLD AUTO: 11.6 % (ref 19.6–45.3)
MCH RBC QN AUTO: 31 PG (ref 26.6–33)
MCHC RBC AUTO-ENTMCNC: 34.3 G/DL (ref 31.5–35.7)
MCV RBC AUTO: 90.4 FL (ref 79–97)
MONOCYTES # BLD AUTO: 0.56 10*3/MM3 (ref 0.1–0.9)
MONOCYTES NFR BLD AUTO: 10 % (ref 5–12)
NEUTROPHILS NFR BLD AUTO: 4.14 10*3/MM3 (ref 1.7–7)
NEUTROPHILS NFR BLD AUTO: 74.1 % (ref 42.7–76)
NRBC BLD AUTO-RTO: 0 /100 WBC (ref 0–0.2)
PLATELET # BLD AUTO: 112 10*3/MM3 (ref 140–450)
PMV BLD AUTO: 10.7 FL (ref 6–12)
POTASSIUM SERPL-SCNC: 4.1 MMOL/L (ref 3.5–5.2)
PROT SERPL-MCNC: 7.3 G/DL (ref 6–8.5)
RBC # BLD AUTO: 5.62 10*6/MM3 (ref 4.14–5.8)
RBC MORPH BLD: NORMAL
RSV RNA NPH QL NAA+NON-PROBE: NOT DETECTED
SARS-COV-2 RNA RESP QL NAA+PROBE: NOT DETECTED
SMALL PLATELETS BLD QL SMEAR: NORMAL
SODIUM SERPL-SCNC: 137 MMOL/L (ref 136–145)
WBC MORPH BLD: NORMAL
WBC NRBC COR # BLD AUTO: 5.59 10*3/MM3 (ref 3.4–10.8)
WHOLE BLOOD HOLD COAG: NORMAL
WHOLE BLOOD HOLD SPECIMEN: NORMAL

## 2025-01-14 PROCEDURE — 36415 COLL VENOUS BLD VENIPUNCTURE: CPT

## 2025-01-14 PROCEDURE — 99283 EMERGENCY DEPT VISIT LOW MDM: CPT

## 2025-01-14 PROCEDURE — 85025 COMPLETE CBC W/AUTO DIFF WBC: CPT | Performed by: EMERGENCY MEDICINE

## 2025-01-14 PROCEDURE — 87637 SARSCOV2&INF A&B&RSV AMP PRB: CPT | Performed by: EMERGENCY MEDICINE

## 2025-01-14 PROCEDURE — 80053 COMPREHEN METABOLIC PANEL: CPT | Performed by: EMERGENCY MEDICINE

## 2025-01-14 PROCEDURE — 83690 ASSAY OF LIPASE: CPT | Performed by: EMERGENCY MEDICINE

## 2025-01-14 PROCEDURE — 85007 BL SMEAR W/DIFF WBC COUNT: CPT | Performed by: EMERGENCY MEDICINE

## 2025-01-14 RX ORDER — BROMPHENIRAMINE MALEATE, PSEUDOEPHEDRINE HYDROCHLORIDE, AND DEXTROMETHORPHAN HYDROBROMIDE 2; 30; 10 MG/5ML; MG/5ML; MG/5ML
10 SYRUP ORAL 3 TIMES DAILY PRN
Qty: 240 ML | Refills: 0 | Status: SHIPPED | OUTPATIENT
Start: 2025-01-14

## 2025-01-14 RX ORDER — SODIUM CHLORIDE 0.9 % (FLUSH) 0.9 %
10 SYRINGE (ML) INJECTION AS NEEDED
Status: DISCONTINUED | OUTPATIENT
Start: 2025-01-14 | End: 2025-01-14 | Stop reason: HOSPADM

## 2025-01-14 RX ORDER — ONDANSETRON 4 MG/1
4 TABLET, ORALLY DISINTEGRATING ORAL 4 TIMES DAILY PRN
Qty: 10 TABLET | Refills: 0 | Status: SHIPPED | OUTPATIENT
Start: 2025-01-14

## 2025-01-14 RX ORDER — OSELTAMIVIR PHOSPHATE 75 MG/1
75 CAPSULE ORAL 2 TIMES DAILY
Qty: 10 CAPSULE | Refills: 0 | Status: SHIPPED | OUTPATIENT
Start: 2025-01-14 | End: 2025-01-19

## 2025-01-14 RX ORDER — IBUPROFEN 600 MG/1
600 TABLET, FILM COATED ORAL EVERY 6 HOURS PRN
Qty: 20 TABLET | Refills: 0 | Status: SHIPPED | OUTPATIENT
Start: 2025-01-14

## 2025-01-14 NOTE — Clinical Note
[facility] EMERGENCY ROOM  [address] [place] [address]  Phone: [phone]  Fax: [phone]    James Leo was seen and treated in our emergency department on 1/14/2025.  He may return to work on 01/16/2025.         Thank you for choosing [facility].    Marce Ortiz APRN

## 2025-01-14 NOTE — DISCHARGE INSTRUCTIONS
You have tested positive for flu A.  You have been provided a prescription for medication called Tamiflu.  This will not cure the virus; however, it can help with the symptoms and shortened the duration of their illness.  Please increase your oral fluid intake particularly water and an electrolyte substance such as Powerade or Gatorade.  You may alternate Tylenol and Motrin as needed for body aches and fevers.  If it anytime you develop any difficulty breathing, a fever that cannot be controlled with Tylenol or Motrin, or sever nausea, vomiting, or diarrhea return to the ER.  Otherwise, you may follow-up with your primary care provider in 3 to 5 days.

## 2025-01-14 NOTE — ED PROVIDER NOTES
Time: 3:16 PM EST  Date of encounter:  1/14/2025  Room number: 61/61  Independent Historian/Clinical History and Information was obtained by:   Patient    History is limited by: N/A    Chief Complaint: bodyaches, cough, nausea, and vomiting      History of Present Illness:  Patient is a 29 y.o. year old male who presents to the emergency department for evaluation of bodyaches, cough, nausea, and vomiting.  Patient denies any known fevers however he does report intermittent chills and hot spells.  Patient's daughter has recently had the flu with similar symptoms.    HPI    Patient Care Team  Primary Care Provider: Kenya De Jesus APRN    Past Medical History:     No Known Allergies  Past Medical History:   Diagnosis Date    Asthma     Mononucleosis      Past Surgical History:   Procedure Laterality Date    DENTAL PROCEDURE       No family history on file.    Home Medications:  Prior to Admission medications    Medication Sig Start Date End Date Taking? Authorizing Provider   acetaminophen (TYLENOL) 500 MG tablet Take 2 tablets by mouth Every 6 (Six) Hours As Needed for Mild Pain or Moderate Pain. 1/9/24   Vivian Cruz PA-C   brompheniramine-pseudoephedrine-DM 30-2-10 MG/5ML syrup Take 10 mL by mouth 4 (Four) Times a Day As Needed for Cough or Allergies. 2/28/24   Sharda Sandoval MD   famotidine (PEPCID) 20 MG tablet Take 1 tablet by mouth Every Night. 10/5/24   Noel Zayas MD   ondansetron ODT (ZOFRAN-ODT) 4 MG disintegrating tablet Place 1 tablet on the tongue 4 (Four) Times a Day As Needed for Nausea or Vomiting. 8/30/24   Marino Jha PA-C        Social History:   Social History     Tobacco Use    Smoking status: Never     Passive exposure: Never    Smokeless tobacco: Never   Vaping Use    Vaping status: Never Used    Passive vaping exposure: Yes   Substance Use Topics    Alcohol use: Yes     Comment: OCC    Drug use: Never         Review of Systems:  Review of Systems   I performed a review of  "systems today, which was all negative, except for the positives found in the HPI above.    Physical Exam:  /68 (BP Location: Right arm, Patient Position: Sitting)   Pulse 114   Temp 99 °F (37.2 °C) (Oral)   Resp 18   Ht 172.7 cm (67.99\")   Wt 70.9 kg (156 lb 4.9 oz)   SpO2 96%   BMI 23.77 kg/m²     Physical Exam   General:  Awake alert no apparent distress  Head: Normocephalic, atraumatic, eyes PERRLA EOMI, nose normal, oropharynx normal  Neck: Supple, no meningismus, no cervical lymphadenopathy or JVD  Heart: Regular rate and rhythm, no murmurs or rubs, 2+ radial pulses  Lungs: Clear to auscultation bilaterally, no wheezing or rhonchi or rales, no respiratory distress  Abdomen: Soft, nontender, nondistended, no signs of peritonitis  Skin: Warm, dry, no rash  Musculoskeletal: Normal range of motion, no obvious deformities, no edema noted  Neurologic: Awake, alert, oriented x 3, no motor or sensory deficits appreciated  Psych: No suicidal or homicidal ideations, no psychosis            Procedures:  Procedures      Medical Decision Making:      Comorbidities that affect care:        External Notes reviewed:          The following orders were placed and all results were independently analyzed by me:  Orders Placed This Encounter   Procedures    COVID-19, FLU A/B, RSV PCR 1 HR TAT - Swab, Nasopharynx    [place] Draw    Comprehensive Metabolic Panel    Lipase    CBC Auto Differential    Scan Slide    NPO Diet NPO Type: Strict NPO    Undress & Gown    Insert Peripheral IV    CBC & Differential    Green Top (Gel)    Lavender Top    Gold Top - SST    Light Blue Top       Medications Given in the Emergency Department:  Medications   sodium chloride 0.9 % flush 10 mL (has no administration in time range)        ED Course:         Labs:    Lab Results (last 24 hours)       Procedure Component Value Units Date/Time    COVID-19, FLU A/B, RSV PCR 1 HR TAT - Swab, Nasopharynx [[id number]]  (Abnormal) Collected: " 01/14/25 1416    Specimen: Swab from Nasopharynx Updated: 01/14/25 1515     COVID19 Not Detected     Influenza A PCR Detected     Influenza B PCR Not Detected     RSV, PCR Not Detected    Narrative:      Fact sheet for providers: https://www.fda.gov/media/740916/download    Fact sheet for patients: https://www.fda.gov/media/292651/download    Test performed by PCR.    CBC & Differential [[id number]]  (Abnormal) Collected: 01/14/25 1425    Specimen: Blood from Arm, Right Updated: 01/14/25 1529    Narrative:      The following orders were created for panel order CBC & Differential.  Procedure                               Abnormality         Status                     ---------                               -----------         ------                     CBC Auto Differential[[id number]]        Abnormal            Final result               Scan Slide[[id number]]                                       Final result                 Please view results for these tests on the individual orders.    Comprehensive Metabolic Panel [[id number]]  (Abnormal) Collected: 01/14/25 1425    Specimen: Blood from Arm, Right Updated: 01/14/25 1514     Glucose 110 mg/dL      BUN 17 mg/dL      Creatinine 1.09 mg/dL      Sodium 137 mmol/L      Potassium 4.1 mmol/L      Chloride 99 mmol/L      CO2 27.1 mmol/L      Calcium 8.8 mg/dL      Total Protein 7.3 g/dL      Albumin 4.5 g/dL      ALT (SGPT) 22 U/L      AST (SGOT) 28 U/L      Alkaline Phosphatase 62 U/L      Total Bilirubin 0.7 mg/dL      Globulin 2.8 gm/dL      A/G Ratio 1.6 g/dL      BUN/Creatinine Ratio 15.6     Anion Gap 10.9 mmol/L      eGFR 93.6 mL/min/1.73     Narrative:      GFR Categories in Chronic Kidney Disease (CKD)      GFR Category          GFR (mL/min/1.73)    Interpretation  G1                     90 or greater         Normal or high (1)  G2                      60-89                Mild decrease (1)  G3a                   45-59                Mild to moderate decrease  G3b                    30-44                Moderate to severe decrease  G4                    15-29                Severe decrease  G5                    14 or less           Kidney failure          (1)In the absence of evidence of kidney disease, neither GFR category G1 or G2 fulfill the criteria for CKD.    eGFR calculation 2021 CKD-EPI creatinine equation, which does not include race as a factor    Lipase [[id number]]  (Normal) Collected: 01/14/25 1425    Specimen: Blood from Arm, Right Updated: 01/14/25 1514     Lipase 23 U/L     CBC Auto Differential [[id number]]  (Abnormal) Collected: 01/14/25 1425    Specimen: Blood from Arm, Right Updated: 01/14/25 1529     WBC 5.59 10*3/mm3      RBC 5.62 10*6/mm3      Hemoglobin 17.4 g/dL      Hematocrit 50.8 %      MCV 90.4 fL      MCH 31.0 pg      MCHC 34.3 g/dL      RDW 11.7 %      RDW-SD 38.7 fl      MPV 10.7 fL      Platelets 112 10*3/mm3      Neutrophil % 74.1 %      Lymphocyte % 11.6 %      Monocyte % 10.0 %      Eosinophil % 3.4 %      Basophil % 0.4 %      Immature Grans % 0.5 %      Neutrophils, Absolute 4.14 10*3/mm3      Lymphocytes, Absolute 0.65 10*3/mm3      Monocytes, Absolute 0.56 10*3/mm3      Eosinophils, Absolute 0.19 10*3/mm3      Basophils, Absolute 0.02 10*3/mm3      Immature Grans, Absolute 0.03 10*3/mm3      nRBC 0.0 /100 WBC     Scan Slide [[id number]] Collected: 01/14/25 1425    Specimen: Blood from Arm, Right Updated: 01/14/25 1529     RBC Morphology Normal     WBC Morphology Normal     Platelet Estimate Decreased             Imaging:    No Radiology Exams Resulted Within Past 24 Hours      Differential Diagnosis and Discussion:    Cough: Differential diagnosis includes but is not limited to pneumonia, acute bronchitis, upper respiratory infection, ACE inhibitor use, allergic reaction, epiglottitis, seasonal allergies, chemical irritants, exercise-induced asthma, viral syndrome.    Labs were collected in the emergency department and all labs were  reviewed and interpreted by me.    [facility]                 Patient Care Considerations:    ANTIBIOTICS: I considered prescribing antibiotics as an outpatient however no bacterial focus of infection was found.      Consultants/Shared Management Plan:    None    Social Determinants of Health:    Patient is independent, reliable, and has access to care.       Disposition and Care Coordination:    Discharged: The patient is suitable and stable for discharge with no need for consideration of admission.    I have explained the patient´s condition, diagnoses and treatment plan based on the information available to me at this time. I have answered questions and addressed any concerns. The patient has a good  understanding of the patient´s diagnosis, condition, and treatment plan as can be expected at this point. The vital signs have been stable. The patient´s condition is stable and appropriate for discharge from the emergency department.      The patient will pursue further outpatient evaluation with the primary care physician or other designated or consulting physician as outlined in the discharge instructions. They are agreeable to this plan of care and follow-up instructions have been explained in detail. The patient has received these instructions in written format and has expressed an understanding of the discharge instructions. The patient is aware that any significant change in condition or worsening of symptoms should prompt an immediate return to this or the closest emergency department or call to 911.    Final diagnoses:   Influenza A   Acute cough   Nausea and vomiting, unspecified vomiting type        ED Disposition       ED Disposition   Discharge    Condition   Stable    Comment   --               This medical record created using voice recognition software.       Marce Ortiz, APRN  01/14/25 [id number]